# Patient Record
Sex: FEMALE | Race: WHITE | Employment: OTHER | URBAN - NONMETROPOLITAN AREA
[De-identification: names, ages, dates, MRNs, and addresses within clinical notes are randomized per-mention and may not be internally consistent; named-entity substitution may affect disease eponyms.]

---

## 2017-01-01 ENCOUNTER — NURSE ONLY (OUTPATIENT)
Dept: CARDIOLOGY CLINIC | Age: 82
End: 2017-01-01
Payer: MEDICARE

## 2017-01-01 ENCOUNTER — APPOINTMENT (OUTPATIENT)
Dept: CT IMAGING | Age: 82
End: 2017-01-01
Payer: MEDICARE

## 2017-01-01 ENCOUNTER — TELEPHONE (OUTPATIENT)
Dept: CARDIOLOGY | Age: 82
End: 2017-01-01

## 2017-01-01 ENCOUNTER — OFFICE VISIT (OUTPATIENT)
Dept: OTOLARYNGOLOGY | Age: 82
End: 2017-01-01

## 2017-01-01 ENCOUNTER — OFFICE VISIT (OUTPATIENT)
Dept: FAMILY MEDICINE CLINIC | Age: 82
End: 2017-01-01
Payer: MEDICARE

## 2017-01-01 ENCOUNTER — TELEPHONE (OUTPATIENT)
Dept: FAMILY MEDICINE CLINIC | Age: 82
End: 2017-01-01

## 2017-01-01 ENCOUNTER — APPOINTMENT (OUTPATIENT)
Dept: GENERAL RADIOLOGY | Age: 82
End: 2017-01-01
Payer: MEDICARE

## 2017-01-01 ENCOUNTER — HOSPITAL ENCOUNTER (EMERGENCY)
Age: 82
Discharge: HOME | End: 2017-11-27
Payer: MEDICARE

## 2017-01-01 ENCOUNTER — OFFICE VISIT (OUTPATIENT)
Dept: CARDIOLOGY CLINIC | Age: 82
End: 2017-01-01
Payer: MEDICARE

## 2017-01-01 ENCOUNTER — CARE COORDINATION (OUTPATIENT)
Dept: CASE MANAGEMENT | Age: 82
End: 2017-01-01

## 2017-01-01 VITALS
SYSTOLIC BLOOD PRESSURE: 132 MMHG | HEART RATE: 80 BPM | BODY MASS INDEX: 24.35 KG/M2 | WEIGHT: 132.3 LBS | HEIGHT: 62 IN | DIASTOLIC BLOOD PRESSURE: 72 MMHG

## 2017-01-01 VITALS
DIASTOLIC BLOOD PRESSURE: 74 MMHG | WEIGHT: 131.6 LBS | RESPIRATION RATE: 16 BRPM | HEART RATE: 72 BPM | BODY MASS INDEX: 24.87 KG/M2 | SYSTOLIC BLOOD PRESSURE: 122 MMHG | TEMPERATURE: 98.2 F

## 2017-01-01 VITALS
SYSTOLIC BLOOD PRESSURE: 115 MMHG | HEART RATE: 85 BPM | RESPIRATION RATE: 16 BRPM | OXYGEN SATURATION: 99 % | TEMPERATURE: 98.7 F | DIASTOLIC BLOOD PRESSURE: 66 MMHG

## 2017-01-01 VITALS
WEIGHT: 137 LBS | RESPIRATION RATE: 16 BRPM | HEART RATE: 80 BPM | HEIGHT: 62 IN | DIASTOLIC BLOOD PRESSURE: 70 MMHG | SYSTOLIC BLOOD PRESSURE: 128 MMHG | BODY MASS INDEX: 25.21 KG/M2

## 2017-01-01 DIAGNOSIS — J44.9 CHRONIC OBSTRUCTIVE PULMONARY DISEASE, UNSPECIFIED COPD TYPE (HCC): ICD-10-CM

## 2017-01-01 DIAGNOSIS — I49.5 SSS (SICK SINUS SYNDROME) (HCC): ICD-10-CM

## 2017-01-01 DIAGNOSIS — F03.91 DEMENTIA WITH BEHAVIORAL DISTURBANCE, UNSPECIFIED DEMENTIA TYPE: Primary | ICD-10-CM

## 2017-01-01 DIAGNOSIS — Z23 NEED FOR INFLUENZA VACCINATION: ICD-10-CM

## 2017-01-01 DIAGNOSIS — R45.1 AGITATION: ICD-10-CM

## 2017-01-01 DIAGNOSIS — H61.23 BILATERAL IMPACTED CERUMEN: Primary | ICD-10-CM

## 2017-01-01 DIAGNOSIS — Z95.0 PACEMAKER: Primary | ICD-10-CM

## 2017-01-01 DIAGNOSIS — Z23 NEED FOR PNEUMOCOCCAL VACCINATION: ICD-10-CM

## 2017-01-01 DIAGNOSIS — Z95.0 PACEMAKER: ICD-10-CM

## 2017-01-01 DIAGNOSIS — S42.91XA CLOSED FRACTURE OF RIGHT SHOULDER, INITIAL ENCOUNTER: Primary | ICD-10-CM

## 2017-01-01 DIAGNOSIS — H61.303 STENOSIS OF BOTH EXTERNAL AUDITORY CANALS: ICD-10-CM

## 2017-01-01 DIAGNOSIS — I49.5 SSS (SICK SINUS SYNDROME) (HCC): Primary | ICD-10-CM

## 2017-01-01 DIAGNOSIS — M25.562 LEFT KNEE PAIN, UNSPECIFIED CHRONICITY: Primary | ICD-10-CM

## 2017-01-01 PROCEDURE — 4040F PNEUMOC VAC/ADMIN/RCVD: CPT | Performed by: FAMILY MEDICINE

## 2017-01-01 PROCEDURE — 72125 CT NECK SPINE W/O DYE: CPT

## 2017-01-01 PROCEDURE — 70450 CT HEAD/BRAIN W/O DYE: CPT

## 2017-01-01 PROCEDURE — G8420 CALC BMI NORM PARAMETERS: HCPCS | Performed by: INTERNAL MEDICINE

## 2017-01-01 PROCEDURE — 1036F TOBACCO NON-USER: CPT | Performed by: NURSE PRACTITIONER

## 2017-01-01 PROCEDURE — 1090F PRES/ABSN URINE INCON ASSESS: CPT | Performed by: INTERNAL MEDICINE

## 2017-01-01 PROCEDURE — G8400 PT W/DXA NO RESULTS DOC: HCPCS | Performed by: FAMILY MEDICINE

## 2017-01-01 PROCEDURE — 90732 PPSV23 VACC 2 YRS+ SUBQ/IM: CPT | Performed by: FAMILY MEDICINE

## 2017-01-01 PROCEDURE — 1090F PRES/ABSN URINE INCON ASSESS: CPT | Performed by: FAMILY MEDICINE

## 2017-01-01 PROCEDURE — 73030 X-RAY EXAM OF SHOULDER: CPT

## 2017-01-01 PROCEDURE — 1036F TOBACCO NON-USER: CPT | Performed by: INTERNAL MEDICINE

## 2017-01-01 PROCEDURE — G8484 FLU IMMUNIZE NO ADMIN: HCPCS | Performed by: FAMILY MEDICINE

## 2017-01-01 PROCEDURE — 3023F SPIROM DOC REV: CPT | Performed by: FAMILY MEDICINE

## 2017-01-01 PROCEDURE — G8427 DOCREV CUR MEDS BY ELIG CLIN: HCPCS | Performed by: INTERNAL MEDICINE

## 2017-01-01 PROCEDURE — 99213 OFFICE O/P EST LOW 20 MIN: CPT | Performed by: INTERNAL MEDICINE

## 2017-01-01 PROCEDURE — A4565 SLINGS: HCPCS

## 2017-01-01 PROCEDURE — 1036F TOBACCO NON-USER: CPT | Performed by: FAMILY MEDICINE

## 2017-01-01 PROCEDURE — 99284 EMERGENCY DEPT VISIT MOD MDM: CPT

## 2017-01-01 PROCEDURE — G0008 ADMIN INFLUENZA VIRUS VAC: HCPCS | Performed by: FAMILY MEDICINE

## 2017-01-01 PROCEDURE — G8926 SPIRO NO PERF OR DOC: HCPCS | Performed by: FAMILY MEDICINE

## 2017-01-01 PROCEDURE — G8419 CALC BMI OUT NRM PARAM NOF/U: HCPCS | Performed by: FAMILY MEDICINE

## 2017-01-01 PROCEDURE — 93280 PM DEVICE PROGR EVAL DUAL: CPT | Performed by: INTERNAL MEDICINE

## 2017-01-01 PROCEDURE — G8400 PT W/DXA NO RESULTS DOC: HCPCS | Performed by: INTERNAL MEDICINE

## 2017-01-01 PROCEDURE — G8427 DOCREV CUR MEDS BY ELIG CLIN: HCPCS | Performed by: FAMILY MEDICINE

## 2017-01-01 PROCEDURE — 69210 REMOVE IMPACTED EAR WAX UNI: CPT | Performed by: NURSE PRACTITIONER

## 2017-01-01 PROCEDURE — 99214 OFFICE O/P EST MOD 30 MIN: CPT | Performed by: FAMILY MEDICINE

## 2017-01-01 PROCEDURE — 4040F PNEUMOC VAC/ADMIN/RCVD: CPT | Performed by: INTERNAL MEDICINE

## 2017-01-01 PROCEDURE — 1123F ACP DISCUSS/DSCN MKR DOCD: CPT | Performed by: INTERNAL MEDICINE

## 2017-01-01 PROCEDURE — G0009 ADMIN PNEUMOCOCCAL VACCINE: HCPCS | Performed by: FAMILY MEDICINE

## 2017-01-01 PROCEDURE — 1123F ACP DISCUSS/DSCN MKR DOCD: CPT | Performed by: FAMILY MEDICINE

## 2017-01-01 PROCEDURE — 90662 IIV NO PRSV INCREASED AG IM: CPT | Performed by: FAMILY MEDICINE

## 2017-01-01 RX ORDER — CIPROFLOXACIN 250 MG/1
250 TABLET, FILM COATED ORAL 2 TIMES DAILY
Qty: 14 TABLET | Refills: 0 | Status: SHIPPED | OUTPATIENT
Start: 2017-01-01 | End: 2017-01-01 | Stop reason: SDUPTHER

## 2017-01-01 RX ORDER — LORAZEPAM 0.5 MG/1
TABLET ORAL
Qty: 60 TABLET | Refills: 2 | Status: SHIPPED | OUTPATIENT
Start: 2017-01-01 | End: 2017-01-01 | Stop reason: SDUPTHER

## 2017-01-01 RX ORDER — LORAZEPAM 0.5 MG/1
TABLET ORAL
Qty: 60 TABLET | Refills: 0 | Status: SHIPPED | OUTPATIENT
Start: 2017-01-01 | End: 2017-01-01 | Stop reason: SDUPTHER

## 2017-01-01 RX ORDER — CIPROFLOXACIN 250 MG/1
250 TABLET, FILM COATED ORAL 2 TIMES DAILY
Qty: 14 TABLET | Refills: 0 | Status: SHIPPED | OUTPATIENT
Start: 2017-01-01 | End: 2017-01-01

## 2017-01-01 RX ORDER — LORAZEPAM 0.5 MG/1
TABLET ORAL
Qty: 90 TABLET | Refills: 2 | Status: ON HOLD | OUTPATIENT
Start: 2017-01-01 | End: 2018-01-01 | Stop reason: HOSPADM

## 2017-01-01 RX ORDER — LORAZEPAM 0.5 MG/1
TABLET ORAL
Qty: 270 TABLET | Refills: 0 | Status: SHIPPED | OUTPATIENT
Start: 2017-01-01 | End: 2017-01-01 | Stop reason: SDUPTHER

## 2017-01-01 ASSESSMENT — ENCOUNTER SYMPTOMS
ABDOMINAL PAIN: 0
ANAL BLEEDING: 0
NAUSEA: 0
STRIDOR: 0
ABDOMINAL DISTENTION: 0
EYE PAIN: 0
GASTROINTESTINAL NEGATIVE: 1
SORE THROAT: 0
COUGH: 0
APNEA: 0
DIARRHEA: 0
BACK PAIN: 0
EYE DISCHARGE: 0
SINUS PRESSURE: 0
RHINORRHEA: 0
BACK PAIN: 0
RECTAL PAIN: 0
FACIAL SWELLING: 0
VOICE CHANGE: 0
VOMITING: 0
EYE ITCHING: 0
PHOTOPHOBIA: 0
NAUSEA: 0
TROUBLE SWALLOWING: 0
CONSTIPATION: 0
SHORTNESS OF BREATH: 0
EYE REDNESS: 0
RESPIRATORY NEGATIVE: 1
COLOR CHANGE: 0
BLOOD IN STOOL: 0
CHEST TIGHTNESS: 0
CHOKING: 0
WHEEZING: 0

## 2017-01-01 ASSESSMENT — PAIN DESCRIPTION - LOCATION: LOCATION: SHOULDER

## 2017-01-01 ASSESSMENT — PAIN DESCRIPTION - PAIN TYPE: TYPE: ACUTE PAIN

## 2017-01-01 ASSESSMENT — PAIN DESCRIPTION - ORIENTATION: ORIENTATION: RIGHT

## 2017-01-01 ASSESSMENT — PAIN SCALES - GENERAL: PAINLEVEL_OUTOF10: 7

## 2017-01-06 ENCOUNTER — TELEPHONE (OUTPATIENT)
Dept: FAMILY MEDICINE CLINIC | Age: 82
End: 2017-01-06

## 2017-01-06 RX ORDER — UMECLIDINIUM BROMIDE AND VILANTEROL TRIFENATATE 62.5; 25 UG/1; UG/1
1 POWDER RESPIRATORY (INHALATION) DAILY
Qty: 1 PUFF | Refills: 11 | Status: ON HOLD | OUTPATIENT
Start: 2017-01-06 | End: 2018-01-01

## 2017-01-24 ENCOUNTER — OFFICE VISIT (OUTPATIENT)
Dept: CARDIOLOGY | Age: 82
End: 2017-01-24

## 2017-01-24 VITALS
BODY MASS INDEX: 25.11 KG/M2 | HEIGHT: 61 IN | DIASTOLIC BLOOD PRESSURE: 62 MMHG | SYSTOLIC BLOOD PRESSURE: 118 MMHG | WEIGHT: 133 LBS | HEART RATE: 84 BPM

## 2017-01-24 DIAGNOSIS — I49.5 SSS (SICK SINUS SYNDROME) (HCC): Primary | ICD-10-CM

## 2017-01-24 DIAGNOSIS — I34.0 MITRAL VALVE INSUFFICIENCY, UNSPECIFIED ETIOLOGY: ICD-10-CM

## 2017-01-24 DIAGNOSIS — R06.02 SOB (SHORTNESS OF BREATH): ICD-10-CM

## 2017-01-24 PROCEDURE — G8400 PT W/DXA NO RESULTS DOC: HCPCS | Performed by: INTERNAL MEDICINE

## 2017-01-24 PROCEDURE — G8484 FLU IMMUNIZE NO ADMIN: HCPCS | Performed by: INTERNAL MEDICINE

## 2017-01-24 PROCEDURE — 1123F ACP DISCUSS/DSCN MKR DOCD: CPT | Performed by: INTERNAL MEDICINE

## 2017-01-24 PROCEDURE — 1036F TOBACCO NON-USER: CPT | Performed by: INTERNAL MEDICINE

## 2017-01-24 PROCEDURE — 4040F PNEUMOC VAC/ADMIN/RCVD: CPT | Performed by: INTERNAL MEDICINE

## 2017-01-24 PROCEDURE — 99213 OFFICE O/P EST LOW 20 MIN: CPT | Performed by: INTERNAL MEDICINE

## 2017-01-24 PROCEDURE — 1090F PRES/ABSN URINE INCON ASSESS: CPT | Performed by: INTERNAL MEDICINE

## 2017-01-24 PROCEDURE — G8427 DOCREV CUR MEDS BY ELIG CLIN: HCPCS | Performed by: INTERNAL MEDICINE

## 2017-01-24 PROCEDURE — G8420 CALC BMI NORM PARAMETERS: HCPCS | Performed by: INTERNAL MEDICINE

## 2017-02-02 ENCOUNTER — TELEPHONE (OUTPATIENT)
Dept: FAMILY MEDICINE CLINIC | Age: 82
End: 2017-02-02

## 2017-02-02 RX ORDER — RIVASTIGMINE 13.3 MG/24H
1 PATCH, EXTENDED RELEASE TRANSDERMAL DAILY
Qty: 90 PATCH | Refills: 3 | Status: SHIPPED | OUTPATIENT
Start: 2017-02-02 | End: 2017-02-16

## 2017-02-15 ENCOUNTER — PATIENT MESSAGE (OUTPATIENT)
Dept: FAMILY MEDICINE CLINIC | Age: 82
End: 2017-02-15

## 2017-02-16 RX ORDER — DONEPEZIL HYDROCHLORIDE 5 MG/1
5 TABLET, FILM COATED ORAL NIGHTLY
Qty: 30 TABLET | Refills: 1 | Status: ON HOLD | OUTPATIENT
Start: 2017-02-16 | End: 2018-01-01

## 2017-11-28 NOTE — ED NOTES
Pt has alzheimer's and lives with daughter. Daughter states she left her home for 15 minutes and when she came home her mother was holding her right shoulder stating \"it hurts. \" With movement, pt states it hurts. Daughter says she is unsure if she fell or what happened.      Jared Kraus RN  11/27/17 2010

## 2017-11-28 NOTE — ED PROVIDER NOTES
DC    PATIENT REFERRED TO:  PATRICK Contreras 8  [phone]    Go in 1 day  12:30pm. Please arrive 15 minutes early. DISCHARGE MEDICATIONS:  Discharge Medication List as of 11/27/2017  9:57 PM          (Please note that portions of this note were completed with a voice recognition program.  Efforts were made to edit the dictations but occasionally words are mis-transcribed.)    Maylene Felty, PA    Scribe:  Maylene Felty 11/27/17 8:58 PM Scribing for and in the presence of [employer], [place]. Signed by: Maylene Felty, Scribe, 11/27/17 10:32 PM    Provider:  I personally performed the services described in the documentation, reviewed and edited the documentation which was dictated to the scribe in my presence, and it accurately records my words and actions.     Gerard Robins PA-C 11/27/17 10:32 PM        Maylene Felty, PA  11/27/17 [id number]

## 2017-12-12 NOTE — TELEPHONE ENCOUNTER
From: Radha Jacinto  Sent: 12/12/2017 2:04 PM EST  Subject: Medication Renewal Request    Radha Orville would like a refill of the following medications:  LORazepam (ATIVAN) 0.5 MG tablet Adalid Trujillo DO]    Preferred pharmacy: [facility] [address] [facility] [phone]    Comment:  Please send this today. I am out. I called and Duran did not have her refill.

## 2017-12-14 NOTE — PROGRESS NOTES
Subjective:      Patient ID: Jacquie Hawthorne is a 80 y.o. female. HPI:    Chief Complaint   Patient presents with    Annual Exam     Broken right Humurus--seeing Dr. Ariel Carrasco for    Flu Vaccine    Other     Pneumonia vaccine     Annual eval.    Recently fell and broke her right humerus, this is being managed by Dr. Ariel Carrasco. Recently seen earlier this week, no healing yet. Will not wear sling. Dementia continues to progress. Now losing words, gets very frustrated. BP well controlled. BP Readings from Last 3 Encounters:   12/14/17 128/70   11/27/17 115/66   07/25/17 132/72     Weight is up. Appetite is fine. Wt Readings from Last 3 Encounters:   12/14/17 137 lb (62.1 kg)   07/25/17 132 lb 4.8 oz (60 kg)   06/26/17 131 lb 9.6 oz (59.7 kg)     Would like flu and pneumonia shot today. Breathing doing well. Has appt with Dr. Ashely Sullivan in January. Patient Active Problem List   Diagnosis    Medtronic dual pacer    SSS [facility]sick [facility] syndrome) ([facility]    Mitral regurgitation    Lower urinary tract infectious disease    History of MRSA infection    Chest pain    SOB (shortness of breath)    COPD (chronic obstructive pulmonary disease) ([facility]    Dementia with behavioral disturbance     Past Surgical History:   Procedure Laterality Date    BLADDER SUSPENSION      HYSTERECTOMY       Prior to Admission medications    Medication Sig Start Date End Date Taking? Authorizing Provider   LORazepam (ATIVAN) 0.5 MG tablet TAKE 1 TABLET IN THE AM AND 2 QHS.  12/13/17  Yes Doron Adames DO   donepezil (ARICEPT) 5 MG tablet Take 1 tablet by mouth nightly 2/16/17  Yes Doron Adames DO   [facility] ELLIPTA 62.5-25 MCG/INH AEPB inhaler Inhale 1 puff into the lungs daily 1/6/17  Yes Doron Adames DO   ranitidine (ZANTAC) 150 MG capsule Take 150 mg by mouth 2 times daily   Yes Historical Provider, MD   ibuprofen (ADVIL;MOTRIN) 400 MG tablet Take 400 mg by mouth every 6 hours as needed for Pain   Yes Historical Provider, MD   calcium carbonate (TUMS) 500 MG chewable tablet Take 2 tablets by mouth daily   Yes Historical Provider, MD   verapamil (CALAN SR) 180 MG extended release tablet Take 1 tablet by mouth nightly 180 mg at bedtime 12/12/16  Yes Odella Rife, DO   hydrochlorothiazide (HYDRODIURIL) 12.5 MG tablet Take 1 tablet by mouth daily 12/12/16  Yes Odella Rife, DO   amLODIPine (NORVASC) 2.5 MG tablet TAKE 1 TABLET DAILY 12/12/16  Yes Odella Rife, DO   rivastigmine (EXELON) 13.3 MG/24HR Place 1 patch onto the skin daily 12/12/16  Yes Morgan Atkinson DO   potassium chloride (KLOR-CON M) 10 MEQ extended release tablet Indications: take 2 tabs in am and 2 tabs in pm Take 2 tabs BID 12/12/16  Yes Morgan Atkinson DO   traMADol (ULTRAM) 50 MG tablet Take 50 mg by mouth 2 times daily as needed for Pain   Yes Historical Provider, MD   albuterol (PROVENTIL HFA;VENTOLIN HFA) 108 (90 BASE) MCG/ACT inhaler Inhale 2 puffs into the lungs every 4 hours as needed for Wheezing 9/25/15  Yes Danna Blackman MD   acetaminophen (TYLENOL) 325 MG tablet Take 650 mg by mouth every 6 hours as needed.    Yes Historical Provider, MD       No results found for: LABA1C  No results found for: EAG    No components found for: CHLPL  No results found for: TRIG  No results found for: HDL  No results found for: LDLCALC  No results found for: LABVLDL      Chemistry        Component Value Date/Time     12/17/2016 1119     12/06/2016 1755    K 3.4 (L) 12/17/2016 1119    K 4.0 12/06/2016 1755    CL 97 (L) 12/06/2016 1755    CO2 30 12/06/2016 1755    BUN 19 12/06/2016 1755    CREATININE 0.74 12/06/2016 1755        Component Value Date/Time    CALCIUM 9.7 12/06/2016 1755    ALKPHOS 100 12/17/2016 1406    AST 23 12/17/2016 1406    ALT 13 12/17/2016 1406    BILITOT 0.4 12/17/2016 1406            Lab Results   Component Value Date    TSH 2.660 06/01/2015       Lab Results   Component Value Date    WBC 6.3 12/17/2016    HGB influenza vaccination  INFLUENZA, HIGH DOSE, 65 YRS +, IM, PF, PREFILL SYR, 0.5ML (FLUZONE HD)   5. Need for pneumococcal vaccination  Pneumococcal polysaccharide vaccine 23-valent greater than or equal to 3yo subcutaneous/IM           Plan:      -  Chronic medical problems stable  -  Continue current medications  -  Follow up with specialists as scheduled  -  Check labs, will call  -  Immunizations above  -  RTO 6 mos    Quality & Risk Score Accuracy - MEDICARE ADVANTAGE    Visit Dx:  Chronic obstructive pulmonary disease, unspecified COPD type ([facility] [address]  Stable based upon symptoms and exam. Continue current treatment plan and follow up at least yearly.   Last edited 12/14/17 14:38 EST by Margarita Singh, DO

## 2018-01-01 ENCOUNTER — APPOINTMENT (OUTPATIENT)
Dept: GENERAL RADIOLOGY | Age: 83
DRG: 470 | End: 2018-01-01
Payer: MEDICARE

## 2018-01-01 ENCOUNTER — TELEPHONE (OUTPATIENT)
Dept: FAMILY MEDICINE CLINIC | Age: 83
End: 2018-01-01

## 2018-01-01 ENCOUNTER — APPOINTMENT (OUTPATIENT)
Dept: CT IMAGING | Age: 83
DRG: 470 | End: 2018-01-01
Payer: MEDICARE

## 2018-01-01 ENCOUNTER — HOSPITAL ENCOUNTER (INPATIENT)
Age: 83
LOS: 1 days | DRG: 189 | End: 2018-02-25
Attending: HOSPITALIST | Admitting: HOSPITALIST
Payer: COMMERCIAL

## 2018-01-01 ENCOUNTER — APPOINTMENT (OUTPATIENT)
Dept: GENERAL RADIOLOGY | Age: 83
DRG: 193 | End: 2018-01-01
Payer: MEDICARE

## 2018-01-01 ENCOUNTER — ANESTHESIA (OUTPATIENT)
Dept: OPERATING ROOM | Age: 83
DRG: 470 | End: 2018-01-01
Payer: MEDICARE

## 2018-01-01 ENCOUNTER — APPOINTMENT (OUTPATIENT)
Dept: CT IMAGING | Age: 83
DRG: 193 | End: 2018-01-01
Payer: MEDICARE

## 2018-01-01 ENCOUNTER — TELEPHONE (OUTPATIENT)
Dept: CARDIOLOGY CLINIC | Age: 83
End: 2018-01-01

## 2018-01-01 ENCOUNTER — HOSPITAL ENCOUNTER (INPATIENT)
Age: 83
LOS: 9 days | Discharge: NURSING, REHAB OR LONG-TERM CARE | DRG: 470 | End: 2018-01-15
Attending: EMERGENCY MEDICINE | Admitting: INTERNAL MEDICINE
Payer: MEDICARE

## 2018-01-01 ENCOUNTER — HOSPITAL ENCOUNTER (INPATIENT)
Age: 83
LOS: 2 days | Discharge: HOSPICE | DRG: 193 | End: 2018-02-24
Attending: EMERGENCY MEDICINE | Admitting: HOSPITALIST
Payer: MEDICARE

## 2018-01-01 ENCOUNTER — ANESTHESIA EVENT (OUTPATIENT)
Dept: OPERATING ROOM | Age: 83
DRG: 470 | End: 2018-01-01
Payer: MEDICARE

## 2018-01-01 VITALS
OXYGEN SATURATION: 93 % | WEIGHT: 145 LBS | HEART RATE: 152 BPM | HEIGHT: 64 IN | RESPIRATION RATE: 22 BRPM | TEMPERATURE: 99.1 F | SYSTOLIC BLOOD PRESSURE: 154 MMHG | BODY MASS INDEX: 24.75 KG/M2 | DIASTOLIC BLOOD PRESSURE: 92 MMHG

## 2018-01-01 VITALS
OXYGEN SATURATION: 94 % | SYSTOLIC BLOOD PRESSURE: 140 MMHG | TEMPERATURE: 98.9 F | HEIGHT: 64 IN | BODY MASS INDEX: 24.91 KG/M2 | HEART RATE: 129 BPM | WEIGHT: 145.9 LBS | RESPIRATION RATE: 25 BRPM | DIASTOLIC BLOOD PRESSURE: 70 MMHG

## 2018-01-01 VITALS — SYSTOLIC BLOOD PRESSURE: 116 MMHG | DIASTOLIC BLOOD PRESSURE: 64 MMHG | OXYGEN SATURATION: 98 %

## 2018-01-01 VITALS
DIASTOLIC BLOOD PRESSURE: 71 MMHG | SYSTOLIC BLOOD PRESSURE: 120 MMHG | OXYGEN SATURATION: 97 % | TEMPERATURE: 97.7 F | BODY MASS INDEX: 22.55 KG/M2 | WEIGHT: 132.1 LBS | HEIGHT: 64 IN | RESPIRATION RATE: 18 BRPM | HEART RATE: 92 BPM

## 2018-01-01 DIAGNOSIS — J96.01 ACUTE RESPIRATORY FAILURE WITH HYPOXIA AND HYPERCAPNIA (HCC): ICD-10-CM

## 2018-01-01 DIAGNOSIS — J86.9 EMPYEMA LUNG (HCC): Primary | ICD-10-CM

## 2018-01-01 DIAGNOSIS — J96.02 ACUTE RESPIRATORY FAILURE WITH HYPOXIA AND HYPERCAPNIA (HCC): ICD-10-CM

## 2018-01-01 DIAGNOSIS — S72.012A SUBCAPITAL FRACTURE OF FEMUR, LEFT, CLOSED, INITIAL ENCOUNTER (HCC): Primary | ICD-10-CM

## 2018-01-01 LAB
ALBUMIN SERPL-MCNC: 2.6 G/DL (ref 3.5–5.1)
ALBUMIN SERPL-MCNC: 3.7 G/DL (ref 3.5–5.1)
ALLEN TEST: POSITIVE
ALP BLD-CCNC: 112 U/L (ref 38–126)
ALP BLD-CCNC: 164 U/L (ref 38–126)
ALT SERPL-CCNC: 30 U/L (ref 11–66)
ALT SERPL-CCNC: 41 U/L (ref 11–66)
AMYLASE: 32 U/L (ref 20–104)
ANION GAP SERPL CALCULATED.3IONS-SCNC: 10 MEQ/L (ref 8–16)
ANION GAP SERPL CALCULATED.3IONS-SCNC: 14 MEQ/L (ref 8–16)
ANION GAP SERPL CALCULATED.3IONS-SCNC: 14 MEQ/L (ref 8–16)
ANION GAP SERPL CALCULATED.3IONS-SCNC: 6 MEQ/L (ref 8–16)
ANION GAP SERPL CALCULATED.3IONS-SCNC: 6 MEQ/L (ref 8–16)
ANION GAP SERPL CALCULATED.3IONS-SCNC: 7 MEQ/L (ref 8–16)
ANION GAP SERPL CALCULATED.3IONS-SCNC: 8 MEQ/L (ref 8–16)
ANISOCYTOSIS: ABNORMAL
APTT: 25.6 SECONDS (ref 22–38)
AST SERPL-CCNC: 24 U/L (ref 5–40)
AST SERPL-CCNC: 38 U/L (ref 5–40)
BACTERIA: ABNORMAL
BACTERIA: ABNORMAL
BASE EXCESS (CALCULATED): 4.5 MMOL/L (ref -2.5–2.5)
BASE EXCESS (CALCULATED): 4.5 MMOL/L (ref -2.5–2.5)
BASE EXCESS (CALCULATED): 4.8 MMOL/L (ref -2.5–2.5)
BASE EXCESS (CALCULATED): 5.4 MMOL/L (ref -2.5–2.5)
BASE EXCESS (CALCULATED): 5.4 MMOL/L (ref -2.5–2.5)
BASE EXCESS (CALCULATED): 5.5 MMOL/L (ref -2.5–2.5)
BASE EXCESS (CALCULATED): 5.9 MMOL/L (ref -2.5–2.5)
BASOPHILS # BLD: 0 %
BASOPHILS # BLD: 0.1 %
BASOPHILS # BLD: 0.2 %
BASOPHILS # BLD: 0.4 %
BASOPHILS # BLD: 0.5 %
BASOPHILS # BLD: 0.5 %
BASOPHILS ABSOLUTE: 0 THOU/MM3 (ref 0–0.1)
BILIRUB SERPL-MCNC: 0.2 MG/DL (ref 0.3–1.2)
BILIRUB SERPL-MCNC: 0.3 MG/DL (ref 0.3–1.2)
BILIRUBIN URINE: NEGATIVE
BILIRUBIN URINE: NEGATIVE
BLOOD, URINE: ABNORMAL
BLOOD, URINE: NEGATIVE
BUN BLDV-MCNC: 10 MG/DL (ref 7–22)
BUN BLDV-MCNC: 11 MG/DL (ref 7–22)
BUN BLDV-MCNC: 17 MG/DL (ref 7–22)
BUN BLDV-MCNC: 19 MG/DL (ref 7–22)
BUN BLDV-MCNC: 20 MG/DL (ref 7–22)
BUN BLDV-MCNC: 21 MG/DL (ref 7–22)
BUN BLDV-MCNC: 25 MG/DL (ref 7–22)
CALCIUM SERPL-MCNC: 8.2 MG/DL (ref 8.5–10.5)
CALCIUM SERPL-MCNC: 8.4 MG/DL (ref 8.5–10.5)
CALCIUM SERPL-MCNC: 8.5 MG/DL (ref 8.5–10.5)
CALCIUM SERPL-MCNC: 8.8 MG/DL (ref 8.5–10.5)
CALCIUM SERPL-MCNC: 9.2 MG/DL (ref 8.5–10.5)
CALCIUM SERPL-MCNC: 9.2 MG/DL (ref 8.5–10.5)
CALCIUM SERPL-MCNC: 9.7 MG/DL (ref 8.5–10.5)
CASTS: ABNORMAL /LPF
CHARACTER, URINE: ABNORMAL
CHARACTER, URINE: ABNORMAL
CHLORIDE BLD-SCNC: 102 MEQ/L (ref 98–111)
CHLORIDE BLD-SCNC: 103 MEQ/L (ref 98–111)
CHLORIDE BLD-SCNC: 103 MEQ/L (ref 98–111)
CHLORIDE BLD-SCNC: 104 MEQ/L (ref 98–111)
CHLORIDE BLD-SCNC: 94 MEQ/L (ref 98–111)
CHLORIDE BLD-SCNC: 96 MEQ/L (ref 98–111)
CHLORIDE BLD-SCNC: 97 MEQ/L (ref 98–111)
CO2: 25 MEQ/L (ref 23–33)
CO2: 27 MEQ/L (ref 23–33)
CO2: 29 MEQ/L (ref 23–33)
CO2: 29 MEQ/L (ref 23–33)
CO2: 30 MEQ/L (ref 23–33)
CO2: 32 MEQ/L (ref 23–33)
CO2: 32 MEQ/L (ref 23–33)
COLLECTED BY:: 4648
COLLECTED BY:: ABNORMAL
COLOR: ABNORMAL
COLOR: YELLOW
CREAT SERPL-MCNC: 0.4 MG/DL (ref 0.4–1.2)
CREAT SERPL-MCNC: 0.5 MG/DL (ref 0.4–1.2)
CREAT SERPL-MCNC: 0.6 MG/DL (ref 0.4–1.2)
CREAT SERPL-MCNC: 0.6 MG/DL (ref 0.4–1.2)
CRYSTALS: ABNORMAL
CRYSTALS: ABNORMAL
DEVICE: ABNORMAL
EKG ATRIAL RATE: 100 BPM
EKG ATRIAL RATE: 102 BPM
EKG ATRIAL RATE: 91 BPM
EKG ATRIAL RATE: 91 BPM
EKG P AXIS: 60 DEGREES
EKG P AXIS: 70 DEGREES
EKG P AXIS: 72 DEGREES
EKG P-R INTERVAL: 136 MS
EKG P-R INTERVAL: 138 MS
EKG P-R INTERVAL: 142 MS
EKG Q-T INTERVAL: 298 MS
EKG Q-T INTERVAL: 300 MS
EKG Q-T INTERVAL: 308 MS
EKG Q-T INTERVAL: 346 MS
EKG QRS DURATION: 72 MS
EKG QRS DURATION: 76 MS
EKG QRS DURATION: 78 MS
EKG QRS DURATION: 80 MS
EKG QTC CALCULATION (BAZETT): 384 MS
EKG QTC CALCULATION (BAZETT): 391 MS
EKG QTC CALCULATION (BAZETT): 409 MS
EKG QTC CALCULATION (BAZETT): 425 MS
EKG R AXIS: 61 DEGREES
EKG R AXIS: 77 DEGREES
EKG R AXIS: 80 DEGREES
EKG R AXIS: 93 DEGREES
EKG T AXIS: 29 DEGREES
EKG T AXIS: 44 DEGREES
EKG T AXIS: 62 DEGREES
EKG T AXIS: 69 DEGREES
EKG VENTRICULAR RATE: 100 BPM
EKG VENTRICULAR RATE: 102 BPM
EKG VENTRICULAR RATE: 106 BPM
EKG VENTRICULAR RATE: 91 BPM
EOSINOPHIL # BLD: 0.1 %
EOSINOPHIL # BLD: 0.1 %
EOSINOPHIL # BLD: 0.5 %
EOSINOPHIL # BLD: 0.7 %
EOSINOPHIL # BLD: 1.3 %
EOSINOPHIL # BLD: 4 %
EOSINOPHILS ABSOLUTE: 0 THOU/MM3 (ref 0–0.4)
EOSINOPHILS ABSOLUTE: 0.1 THOU/MM3 (ref 0–0.4)
EOSINOPHILS ABSOLUTE: 0.1 THOU/MM3 (ref 0–0.4)
EOSINOPHILS ABSOLUTE: 0.2 THOU/MM3 (ref 0–0.4)
EPITHELIAL CELLS, UA: ABNORMAL /HPF
EPITHELIAL CELLS, UA: ABNORMAL /HPF
FLU A ANTIGEN: NEGATIVE
FLU B ANTIGEN: NEGATIVE
GFR SERPL CREATININE-BSD FRML MDRD: > 90 ML/MIN/1.73M2
GLUCOSE BLD-MCNC: 102 MG/DL (ref 70–108)
GLUCOSE BLD-MCNC: 103 MG/DL (ref 70–108)
GLUCOSE BLD-MCNC: 105 MG/DL (ref 70–108)
GLUCOSE BLD-MCNC: 123 MG/DL (ref 70–108)
GLUCOSE BLD-MCNC: 131 MG/DL (ref 70–108)
GLUCOSE BLD-MCNC: 134 MG/DL (ref 70–108)
GLUCOSE BLD-MCNC: 153 MG/DL (ref 70–108)
GLUCOSE, URINE: NEGATIVE MG/DL
GLUCOSE, URINE: NEGATIVE MG/DL
HCO3: 32 MMOL/L (ref 23–28)
HCO3: 33 MMOL/L (ref 23–28)
HCO3: 33 MMOL/L (ref 23–28)
HCO3: 35 MMOL/L (ref 23–28)
HCT VFR BLD CALC: 26.9 % (ref 37–47)
HCT VFR BLD CALC: 27 % (ref 37–47)
HCT VFR BLD CALC: 27.9 % (ref 37–47)
HCT VFR BLD CALC: 27.9 % (ref 37–47)
HCT VFR BLD CALC: 29.9 % (ref 37–47)
HCT VFR BLD CALC: 31.6 % (ref 37–47)
HCT VFR BLD CALC: 32.9 % (ref 37–47)
HCT VFR BLD CALC: 35.5 % (ref 37–47)
HCT VFR BLD CALC: 35.9 % (ref 37–47)
HEMOCCULT STL QL: NEGATIVE
HEMOGLOBIN: 10.4 GM/DL (ref 12–16)
HEMOGLOBIN: 11.6 GM/DL (ref 12–16)
HEMOGLOBIN: 11.8 GM/DL (ref 12–16)
HEMOGLOBIN: 8.8 GM/DL (ref 12–16)
HEMOGLOBIN: 8.9 GM/DL (ref 12–16)
HEMOGLOBIN: 9 GM/DL (ref 12–16)
HEMOGLOBIN: 9.3 GM/DL (ref 12–16)
HEMOGLOBIN: 9.9 GM/DL (ref 12–16)
HEMOGLOBIN: 9.9 GM/DL (ref 12–16)
IFIO2: 3
IFIO2: 35
IFIO2: 40
IFIO2: 50
INR BLD: 1.03 (ref 0.85–1.13)
KETONES, URINE: NEGATIVE
KETONES, URINE: NEGATIVE
LACTIC ACID, SEPSIS: 1.8 MMOL/L (ref 0.5–1.9)
LACTIC ACID, SEPSIS: 1.9 MMOL/L (ref 0.5–1.9)
LACTIC ACID: 0.8 MMOL/L (ref 0.5–2.2)
LEUKOCYTE EST, POC: ABNORMAL
LEUKOCYTE ESTERASE, URINE: NEGATIVE
LIPASE: 10.9 U/L (ref 5.6–51.3)
LIPASE: 15.9 U/L (ref 5.6–51.3)
LYMPHOCYTES # BLD: 10 %
LYMPHOCYTES # BLD: 11.4 %
LYMPHOCYTES # BLD: 14.2 %
LYMPHOCYTES # BLD: 17.2 %
LYMPHOCYTES # BLD: 20.5 %
LYMPHOCYTES # BLD: 6 %
LYMPHOCYTES ABSOLUTE: 0.6 THOU/MM3 (ref 1–4.8)
LYMPHOCYTES ABSOLUTE: 0.8 THOU/MM3 (ref 1–4.8)
LYMPHOCYTES ABSOLUTE: 0.9 THOU/MM3 (ref 1–4.8)
LYMPHOCYTES ABSOLUTE: 1 THOU/MM3 (ref 1–4.8)
LYMPHOCYTES ABSOLUTE: 1.2 THOU/MM3 (ref 1–4.8)
LYMPHOCYTES ABSOLUTE: 1.8 THOU/MM3 (ref 1–4.8)
MAGNESIUM: 1.9 MG/DL (ref 1.6–2.4)
MCH RBC QN AUTO: 28 PG (ref 27–31)
MCH RBC QN AUTO: 28.3 PG (ref 27–31)
MCH RBC QN AUTO: 29.1 PG (ref 27–31)
MCH RBC QN AUTO: 29.1 PG (ref 27–31)
MCH RBC QN AUTO: 29.2 PG (ref 27–31)
MCH RBC QN AUTO: 29.6 PG (ref 27–31)
MCH RBC QN AUTO: 29.7 PG (ref 27–31)
MCHC RBC AUTO-ENTMCNC: 31.4 GM/DL (ref 33–37)
MCHC RBC AUTO-ENTMCNC: 31.6 GM/DL (ref 33–37)
MCHC RBC AUTO-ENTMCNC: 32.4 GM/DL (ref 33–37)
MCHC RBC AUTO-ENTMCNC: 32.8 GM/DL (ref 33–37)
MCHC RBC AUTO-ENTMCNC: 32.8 GM/DL (ref 33–37)
MCHC RBC AUTO-ENTMCNC: 33.2 GM/DL (ref 33–37)
MCHC RBC AUTO-ENTMCNC: 33.2 GM/DL (ref 33–37)
MCV RBC AUTO: 88.6 FL (ref 81–99)
MCV RBC AUTO: 88.9 FL (ref 81–99)
MCV RBC AUTO: 89 FL (ref 81–99)
MCV RBC AUTO: 89.3 FL (ref 81–99)
MCV RBC AUTO: 89.5 FL (ref 81–99)
MCV RBC AUTO: 89.5 FL (ref 81–99)
MCV RBC AUTO: 89.8 FL (ref 81–99)
MISCELLANEOUS LAB TEST RESULT: ABNORMAL
MISCELLANEOUS LAB TEST RESULT: ABNORMAL
MODE: ABNORMAL
MONOCYTES # BLD: 12.3 %
MONOCYTES # BLD: 12.5 %
MONOCYTES # BLD: 13.8 %
MONOCYTES # BLD: 18.2 %
MONOCYTES # BLD: 7.5 %
MONOCYTES # BLD: 8.5 %
MONOCYTES ABSOLUTE: 0.8 THOU/MM3 (ref 0.4–1.3)
MONOCYTES ABSOLUTE: 1 THOU/MM3 (ref 0.4–1.3)
MONOCYTES ABSOLUTE: 1.1 THOU/MM3 (ref 0.4–1.3)
MONOCYTES ABSOLUTE: 1.3 THOU/MM3 (ref 0.4–1.3)
MRSA SCREEN RT-PCR: NEGATIVE
MRSA SCREEN: NORMAL
MUCUS: ABNORMAL
NITRITE, URINE: NEGATIVE
NITRITE, URINE: NEGATIVE
NUCLEATED RED BLOOD CELLS: 0 /100 WBC
O2 SATURATION: 84 %
O2 SATURATION: 89 %
O2 SATURATION: 92 %
O2 SATURATION: 96 %
O2 SATURATION: 96 %
O2 SATURATION: 97 %
O2 SATURATION: 98 %
OSMOLALITY CALCULATION: 276.8 MOSMOL/KG (ref 275–300)
OSMOLALITY CALCULATION: 286.8 MOSMOL/KG (ref 275–300)
PCO2: 58 MMHG (ref 35–45)
PCO2: 64 MMHG (ref 35–45)
PCO2: 65 MMHG (ref 35–45)
PCO2: 78 MMHG (ref 35–45)
PCO2: 79 MMHG (ref 35–45)
PCO2: 81 MMHG (ref 35–45)
PCO2: 85 MMHG (ref 35–45)
PDW BLD-RTO: 13.4 % (ref 11.5–14.5)
PDW BLD-RTO: 13.8 % (ref 11.5–14.5)
PDW BLD-RTO: 13.9 % (ref 11.5–14.5)
PDW BLD-RTO: 14.4 % (ref 11.5–14.5)
PDW BLD-RTO: 15 % (ref 11.5–14.5)
PDW BLD-RTO: 15 % (ref 11.5–14.5)
PDW BLD-RTO: 15.6 % (ref 11.5–14.5)
PH BLOOD GAS: 7.22 (ref 7.35–7.45)
PH BLOOD GAS: 7.24 (ref 7.35–7.45)
PH BLOOD GAS: 7.25 (ref 7.35–7.45)
PH BLOOD GAS: 7.25 (ref 7.35–7.45)
PH BLOOD GAS: 7.31 (ref 7.35–7.45)
PH BLOOD GAS: 7.31 (ref 7.35–7.45)
PH BLOOD GAS: 7.36 (ref 7.35–7.45)
PH UA: 5.5
PH UA: 8
PLATELET # BLD: 168 THOU/MM3 (ref 130–400)
PLATELET # BLD: 185 THOU/MM3 (ref 130–400)
PLATELET # BLD: 234 THOU/MM3 (ref 130–400)
PLATELET # BLD: 236 THOU/MM3 (ref 130–400)
PLATELET # BLD: 295 THOU/MM3 (ref 130–400)
PLATELET # BLD: 327 THOU/MM3 (ref 130–400)
PLATELET # BLD: 425 THOU/MM3 (ref 130–400)
PMV BLD AUTO: 8.2 MCM (ref 7.4–10.4)
PMV BLD AUTO: 8.3 FL (ref 7.4–10.4)
PMV BLD AUTO: 8.8 FL (ref 7.4–10.4)
PMV BLD AUTO: 8.8 FL (ref 7.4–10.4)
PMV BLD AUTO: 9.1 MCM (ref 7.4–10.4)
PMV BLD AUTO: 9.5 MCM (ref 7.4–10.4)
PMV BLD AUTO: 9.5 MCM (ref 7.4–10.4)
PO2: 106 MMHG (ref 71–104)
PO2: 60 MMHG (ref 71–104)
PO2: 70 MMHG (ref 71–104)
PO2: 79 MMHG (ref 71–104)
PO2: 94 MMHG (ref 71–104)
PO2: 96 MMHG (ref 71–104)
PO2: 97 MMHG (ref 71–104)
POTASSIUM REFLEX MAGNESIUM: 4.5 MEQ/L (ref 3.5–5.2)
POTASSIUM SERPL-SCNC: 3.7 MEQ/L (ref 3.5–5.2)
POTASSIUM SERPL-SCNC: 3.8 MEQ/L (ref 3.5–5.2)
POTASSIUM SERPL-SCNC: 4.2 MEQ/L (ref 3.5–5.2)
POTASSIUM SERPL-SCNC: 4.3 MEQ/L (ref 3.5–5.2)
POTASSIUM SERPL-SCNC: 4.4 MEQ/L (ref 3.5–5.2)
POTASSIUM SERPL-SCNC: 4.9 MEQ/L (ref 3.5–5.2)
PROCALCITONIN: 0.19 NG/ML (ref 0.01–0.09)
PROCALCITONIN: 0.26 NG/ML (ref 0.01–0.09)
PROTEIN UA: 100 MG/DL
PROTEIN UA: NEGATIVE MG/DL
RBC # BLD: 3.01 MILL/MM3 (ref 4.2–5.4)
RBC # BLD: 3.11 MILL/MM3 (ref 4.2–5.4)
RBC # BLD: 3.35 MILL/MM3 (ref 4.2–5.4)
RBC # BLD: 3.56 MILL/MM3 (ref 4.2–5.4)
RBC # BLD: 3.68 MILL/MM3 (ref 4.2–5.4)
RBC # BLD: 3.99 MILL/MM3 (ref 4.2–5.4)
RBC # BLD: 4.05 MILL/MM3 (ref 4.2–5.4)
RBC URINE: ABNORMAL /HPF
RBC URINE: ABNORMAL /HPF
RENAL EPITHELIAL, UA: ABNORMAL
RENAL EPITHELIAL, UA: ABNORMAL
SEG NEUTROPHILS: 61.2 %
SEG NEUTROPHILS: 67.1 %
SEG NEUTROPHILS: 73.8 %
SEG NEUTROPHILS: 75.3 %
SEG NEUTROPHILS: 80.5 %
SEG NEUTROPHILS: 81.6 %
SEGMENTED NEUTROPHILS ABSOLUTE COUNT: 3.5 THOU/MM3 (ref 1.8–7.7)
SEGMENTED NEUTROPHILS ABSOLUTE COUNT: 4 THOU/MM3 (ref 1.8–7.7)
SEGMENTED NEUTROPHILS ABSOLUTE COUNT: 6.1 THOU/MM3 (ref 1.8–7.7)
SEGMENTED NEUTROPHILS ABSOLUTE COUNT: 7.8 THOU/MM3 (ref 1.8–7.7)
SEGMENTED NEUTROPHILS ABSOLUTE COUNT: 8 THOU/MM3 (ref 1.8–7.7)
SEGMENTED NEUTROPHILS ABSOLUTE COUNT: 8.8 THOU/MM3 (ref 1.8–7.7)
SET PEEP: 6 MMHG
SET PEEP: 8 MMHG
SET PRESS SUPP: 12 CMH2O
SET PRESS SUPP: 14 CMH2O
SET PRESS SUPP: 16 CMH2O
SET RESPIRATORY RATE: 20 BPM
SODIUM BLD-SCNC: 135 MEQ/L (ref 135–145)
SODIUM BLD-SCNC: 137 MEQ/L (ref 135–145)
SODIUM BLD-SCNC: 138 MEQ/L (ref 135–145)
SODIUM BLD-SCNC: 139 MEQ/L (ref 135–145)
SODIUM BLD-SCNC: 139 MEQ/L (ref 135–145)
SODIUM BLD-SCNC: 140 MEQ/L (ref 135–145)
SODIUM BLD-SCNC: 140 MEQ/L (ref 135–145)
SOURCE, BLOOD GAS: ABNORMAL
SPECIFIC GRAVITY UA: 1.01 (ref 1–1.03)
SPECIFIC GRAVITY UA: 1.02 (ref 1–1.03)
STREP PNEUMO AG, UR: NEGATIVE
TOTAL PROTEIN: 6.2 G/DL (ref 6.1–8)
TOTAL PROTEIN: 7.7 G/DL (ref 6.1–8)
TSH SERPL DL<=0.05 MIU/L-ACNC: 1.3 UIU/ML (ref 0.4–4.2)
URINE CULTURE, ROUTINE: NORMAL
UROBILINOGEN, URINE: 0.2 EU/DL
UROBILINOGEN, URINE: 0.2 EU/DL
VRE CULTURE: NORMAL
WBC # BLD: 10.6 THOU/MM3 (ref 4.8–10.8)
WBC # BLD: 10.8 THOU/MM3 (ref 4.8–10.8)
WBC # BLD: 5.7 THOU/MM3 (ref 4.8–10.8)
WBC # BLD: 5.9 THOU/MM3 (ref 4.8–10.8)
WBC # BLD: 8.1 THOU/MM3 (ref 4.8–10.8)
WBC # BLD: 9.1 THOU/MM3 (ref 4.8–10.8)
WBC # BLD: 9.9 THOU/MM3 (ref 4.8–10.8)
WBC UA: > 100 /HPF
WBC UA: ABNORMAL /HPF
YEAST: ABNORMAL
YEAST: ABNORMAL

## 2018-01-01 PROCEDURE — 84443 ASSAY THYROID STIM HORMONE: CPT

## 2018-01-01 PROCEDURE — 6360000002 HC RX W HCPCS: Performed by: INTERNAL MEDICINE

## 2018-01-01 PROCEDURE — 85014 HEMATOCRIT: CPT

## 2018-01-01 PROCEDURE — 6370000000 HC RX 637 (ALT 250 FOR IP): Performed by: INTERNAL MEDICINE

## 2018-01-01 PROCEDURE — 36415 COLL VENOUS BLD VENIPUNCTURE: CPT

## 2018-01-01 PROCEDURE — 6360000002 HC RX W HCPCS: Performed by: ORTHOPAEDIC SURGERY

## 2018-01-01 PROCEDURE — 97116 GAIT TRAINING THERAPY: CPT

## 2018-01-01 PROCEDURE — 6370000000 HC RX 637 (ALT 250 FOR IP): Performed by: NURSE PRACTITIONER

## 2018-01-01 PROCEDURE — 2580000003 HC RX 258: Performed by: INTERNAL MEDICINE

## 2018-01-01 PROCEDURE — 2500000003 HC RX 250 WO HCPCS: Performed by: EMERGENCY MEDICINE

## 2018-01-01 PROCEDURE — 6370000000 HC RX 637 (ALT 250 FOR IP): Performed by: FAMILY MEDICINE

## 2018-01-01 PROCEDURE — 6360000002 HC RX W HCPCS: Performed by: HOSPITALIST

## 2018-01-01 PROCEDURE — 81001 URINALYSIS AUTO W/SCOPE: CPT

## 2018-01-01 PROCEDURE — 7100000001 HC PACU RECOVERY - ADDTL 15 MIN: Performed by: ORTHOPAEDIC SURGERY

## 2018-01-01 PROCEDURE — 2500000003 HC RX 250 WO HCPCS: Performed by: INTERNAL MEDICINE

## 2018-01-01 PROCEDURE — 2700000000 HC OXYGEN THERAPY PER DAY

## 2018-01-01 PROCEDURE — 82803 BLOOD GASES ANY COMBINATION: CPT

## 2018-01-01 PROCEDURE — 94660 CPAP INITIATION&MGMT: CPT

## 2018-01-01 PROCEDURE — 87040 BLOOD CULTURE FOR BACTERIA: CPT

## 2018-01-01 PROCEDURE — 97110 THERAPEUTIC EXERCISES: CPT

## 2018-01-01 PROCEDURE — G8987 SELF CARE CURRENT STATUS: HCPCS

## 2018-01-01 PROCEDURE — 93005 ELECTROCARDIOGRAM TRACING: CPT | Performed by: HOSPITALIST

## 2018-01-01 PROCEDURE — 6370000000 HC RX 637 (ALT 250 FOR IP): Performed by: ORTHOPAEDIC SURGERY

## 2018-01-01 PROCEDURE — 99232 SBSQ HOSP IP/OBS MODERATE 35: CPT | Performed by: HOSPITALIST

## 2018-01-01 PROCEDURE — 70450 CT HEAD/BRAIN W/O DYE: CPT

## 2018-01-01 PROCEDURE — 93010 ELECTROCARDIOGRAM REPORT: CPT | Performed by: INTERNAL MEDICINE

## 2018-01-01 PROCEDURE — 85025 COMPLETE CBC W/AUTO DIFF WBC: CPT

## 2018-01-01 PROCEDURE — 82150 ASSAY OF AMYLASE: CPT

## 2018-01-01 PROCEDURE — 97530 THERAPEUTIC ACTIVITIES: CPT

## 2018-01-01 PROCEDURE — 96376 TX/PRO/DX INJ SAME DRUG ADON: CPT

## 2018-01-01 PROCEDURE — 2580000003 HC RX 258: Performed by: HOSPITALIST

## 2018-01-01 PROCEDURE — 87641 MR-STAPH DNA AMP PROBE: CPT

## 2018-01-01 PROCEDURE — 3700000001 HC ADD 15 MINUTES (ANESTHESIA): Performed by: ORTHOPAEDIC SURGERY

## 2018-01-01 PROCEDURE — 36600 WITHDRAWAL OF ARTERIAL BLOOD: CPT

## 2018-01-01 PROCEDURE — 6370000000 HC RX 637 (ALT 250 FOR IP): Performed by: HOSPITALIST

## 2018-01-01 PROCEDURE — 2060000000 HC ICU INTERMEDIATE R&B

## 2018-01-01 PROCEDURE — 2500000003 HC RX 250 WO HCPCS: Performed by: HOSPITALIST

## 2018-01-01 PROCEDURE — 7100000000 HC PACU RECOVERY - FIRST 15 MIN: Performed by: ORTHOPAEDIC SURGERY

## 2018-01-01 PROCEDURE — 83605 ASSAY OF LACTIC ACID: CPT

## 2018-01-01 PROCEDURE — 94640 AIRWAY INHALATION TREATMENT: CPT

## 2018-01-01 PROCEDURE — 72170 X-RAY EXAM OF PELVIS: CPT

## 2018-01-01 PROCEDURE — 85018 HEMOGLOBIN: CPT

## 2018-01-01 PROCEDURE — 83735 ASSAY OF MAGNESIUM: CPT

## 2018-01-01 PROCEDURE — 51702 INSERT TEMP BLADDER CATH: CPT

## 2018-01-01 PROCEDURE — 87086 URINE CULTURE/COLONY COUNT: CPT

## 2018-01-01 PROCEDURE — 99223 1ST HOSP IP/OBS HIGH 75: CPT | Performed by: HOSPITALIST

## 2018-01-01 PROCEDURE — 99285 EMERGENCY DEPT VISIT HI MDM: CPT

## 2018-01-01 PROCEDURE — 99223 1ST HOSP IP/OBS HIGH 75: CPT | Performed by: INTERNAL MEDICINE

## 2018-01-01 PROCEDURE — 2580000003 HC RX 258: Performed by: EMERGENCY MEDICINE

## 2018-01-01 PROCEDURE — 83690 ASSAY OF LIPASE: CPT

## 2018-01-01 PROCEDURE — 99232 SBSQ HOSP IP/OBS MODERATE 35: CPT | Performed by: INTERNAL MEDICINE

## 2018-01-01 PROCEDURE — APPNB15 APP NON BILLABLE TIME 0-15 MINS: Performed by: PHYSICIAN ASSISTANT

## 2018-01-01 PROCEDURE — 3600000015 HC SURGERY LEVEL 5 ADDTL 15MIN: Performed by: ORTHOPAEDIC SURGERY

## 2018-01-01 PROCEDURE — 96375 TX/PRO/DX INJ NEW DRUG ADDON: CPT

## 2018-01-01 PROCEDURE — 96374 THER/PROPH/DIAG INJ IV PUSH: CPT

## 2018-01-01 PROCEDURE — 1200000000 HC SEMI PRIVATE

## 2018-01-01 PROCEDURE — 82272 OCCULT BLD FECES 1-3 TESTS: CPT

## 2018-01-01 PROCEDURE — 6360000002 HC RX W HCPCS: Performed by: NURSE ANESTHETIST, CERTIFIED REGISTERED

## 2018-01-01 PROCEDURE — 72125 CT NECK SPINE W/O DYE: CPT

## 2018-01-01 PROCEDURE — 94761 N-INVAS EAR/PLS OXIMETRY MLT: CPT

## 2018-01-01 PROCEDURE — 80048 BASIC METABOLIC PNL TOTAL CA: CPT

## 2018-01-01 PROCEDURE — 71045 X-RAY EXAM CHEST 1 VIEW: CPT

## 2018-01-01 PROCEDURE — 73502 X-RAY EXAM HIP UNI 2-3 VIEWS: CPT

## 2018-01-01 PROCEDURE — C1776 JOINT DEVICE (IMPLANTABLE): HCPCS | Performed by: ORTHOPAEDIC SURGERY

## 2018-01-01 PROCEDURE — G8988 SELF CARE GOAL STATUS: HCPCS

## 2018-01-01 PROCEDURE — 2500000003 HC RX 250 WO HCPCS: Performed by: ORTHOPAEDIC SURGERY

## 2018-01-01 PROCEDURE — 87804 INFLUENZA ASSAY W/OPTIC: CPT

## 2018-01-01 PROCEDURE — G8978 MOBILITY CURRENT STATUS: HCPCS

## 2018-01-01 PROCEDURE — G8979 MOBILITY GOAL STATUS: HCPCS

## 2018-01-01 PROCEDURE — 1200000003 HC TELEMETRY R&B

## 2018-01-01 PROCEDURE — 6360000002 HC RX W HCPCS: Performed by: EMERGENCY MEDICINE

## 2018-01-01 PROCEDURE — 99231 SBSQ HOSP IP/OBS SF/LOW 25: CPT | Performed by: INTERNAL MEDICINE

## 2018-01-01 PROCEDURE — 85730 THROMBOPLASTIN TIME PARTIAL: CPT

## 2018-01-01 PROCEDURE — 99233 SBSQ HOSP IP/OBS HIGH 50: CPT | Performed by: HOSPITALIST

## 2018-01-01 PROCEDURE — 84145 PROCALCITONIN (PCT): CPT

## 2018-01-01 PROCEDURE — 3700000000 HC ANESTHESIA ATTENDED CARE: Performed by: ORTHOPAEDIC SURGERY

## 2018-01-01 PROCEDURE — 73552 X-RAY EXAM OF FEMUR 2/>: CPT

## 2018-01-01 PROCEDURE — 87899 AGENT NOS ASSAY W/OPTIC: CPT

## 2018-01-01 PROCEDURE — 97162 PT EVAL MOD COMPLEX 30 MIN: CPT

## 2018-01-01 PROCEDURE — 72131 CT LUMBAR SPINE W/O DYE: CPT

## 2018-01-01 PROCEDURE — 97166 OT EVAL MOD COMPLEX 45 MIN: CPT

## 2018-01-01 PROCEDURE — 72128 CT CHEST SPINE W/O DYE: CPT

## 2018-01-01 PROCEDURE — 74018 RADEX ABDOMEN 1 VIEW: CPT

## 2018-01-01 PROCEDURE — 96365 THER/PROPH/DIAG IV INF INIT: CPT

## 2018-01-01 PROCEDURE — 6370000000 HC RX 637 (ALT 250 FOR IP): Performed by: EMERGENCY MEDICINE

## 2018-01-01 PROCEDURE — 99239 HOSP IP/OBS DSCHRG MGMT >30: CPT | Performed by: INTERNAL MEDICINE

## 2018-01-01 PROCEDURE — 0SRS0J9 REPLACEMENT OF LEFT HIP JOINT, FEMORAL SURFACE WITH SYNTHETIC SUBSTITUTE, CEMENTED, OPEN APPROACH: ICD-10-PCS | Performed by: ORTHOPAEDIC SURGERY

## 2018-01-01 PROCEDURE — 85610 PROTHROMBIN TIME: CPT

## 2018-01-01 PROCEDURE — 93005 ELECTROCARDIOGRAM TRACING: CPT

## 2018-01-01 PROCEDURE — 80053 COMPREHEN METABOLIC PANEL: CPT

## 2018-01-01 PROCEDURE — 2580000003 HC RX 258: Performed by: NURSE ANESTHETIST, CERTIFIED REGISTERED

## 2018-01-01 PROCEDURE — 2500000003 HC RX 250 WO HCPCS: Performed by: NURSE ANESTHETIST, CERTIFIED REGISTERED

## 2018-01-01 PROCEDURE — 93005 ELECTROCARDIOGRAM TRACING: CPT | Performed by: EMERGENCY MEDICINE

## 2018-01-01 PROCEDURE — 99231 SBSQ HOSP IP/OBS SF/LOW 25: CPT | Performed by: PHYSICIAN ASSISTANT

## 2018-01-01 PROCEDURE — 85027 COMPLETE CBC AUTOMATED: CPT

## 2018-01-01 PROCEDURE — 71250 CT THORAX DX C-: CPT

## 2018-01-01 PROCEDURE — 99222 1ST HOSP IP/OBS MODERATE 55: CPT | Performed by: PHYSICAL MEDICINE & REHABILITATION

## 2018-01-01 PROCEDURE — 3600000005 HC SURGERY LEVEL 5 BASE: Performed by: ORTHOPAEDIC SURGERY

## 2018-01-01 PROCEDURE — 87081 CULTURE SCREEN ONLY: CPT

## 2018-01-01 DEVICE — TANDEM UNIPOLAR 12/14 TAPER SLEEVE +4
Type: IMPLANTABLE DEVICE | Status: FUNCTIONAL
Brand: TANDEM

## 2018-01-01 DEVICE — TANDEM UNIPOLAR HEAD 47MM
Type: IMPLANTABLE DEVICE | Status: FUNCTIONAL
Brand: TANDEM

## 2018-01-01 DEVICE — CONQUEST FX FEMORAL COMPONENT SIZE 14
Type: IMPLANTABLE DEVICE | Status: FUNCTIONAL
Brand: CONQUEST FX

## 2018-01-01 RX ORDER — DEXTROSE AND SODIUM CHLORIDE 5; .45 G/100ML; G/100ML
INJECTION, SOLUTION INTRAVENOUS CONTINUOUS
Status: CANCELLED | OUTPATIENT
Start: 2018-01-01

## 2018-01-01 RX ORDER — POLYETHYLENE GLYCOL 3350 17 G/17G
17 POWDER, FOR SOLUTION ORAL DAILY
COMMUNITY

## 2018-01-01 RX ORDER — MEGESTROL ACETATE 40 MG/ML
200 SUSPENSION ORAL DAILY
Status: DISCONTINUED | OUTPATIENT
Start: 2018-01-01 | End: 2018-01-01 | Stop reason: HOSPADM

## 2018-01-01 RX ORDER — SODIUM CHLORIDE 9 MG/ML
INJECTION, SOLUTION INTRAVENOUS CONTINUOUS PRN
Status: DISCONTINUED | OUTPATIENT
Start: 2018-01-01 | End: 2018-01-01 | Stop reason: SDUPTHER

## 2018-01-01 RX ORDER — SENNA PLUS 8.6 MG/1
1 TABLET ORAL NIGHTLY
Qty: 30 TABLET | Refills: 0 | Status: SHIPPED | OUTPATIENT
Start: 2018-01-01 | End: 2018-01-01

## 2018-01-01 RX ORDER — PROMETHAZINE HYDROCHLORIDE 25 MG/ML
12.5 INJECTION, SOLUTION INTRAMUSCULAR; INTRAVENOUS
Status: DISCONTINUED | OUTPATIENT
Start: 2018-01-01 | End: 2018-01-01 | Stop reason: HOSPADM

## 2018-01-01 RX ORDER — IPRATROPIUM BROMIDE AND ALBUTEROL SULFATE 2.5; .5 MG/3ML; MG/3ML
1 SOLUTION RESPIRATORY (INHALATION) EVERY 4 HOURS PRN
Status: CANCELLED | OUTPATIENT
Start: 2018-01-01

## 2018-01-01 RX ORDER — UREA 10 %
10 LOTION (ML) TOPICAL NIGHTLY PRN
Status: DISCONTINUED | OUTPATIENT
Start: 2018-01-01 | End: 2018-01-01 | Stop reason: HOSPADM

## 2018-01-01 RX ORDER — SODIUM CHLORIDE 0.9 % (FLUSH) 0.9 %
10 SYRINGE (ML) INJECTION PRN
Status: CANCELLED | OUTPATIENT
Start: 2018-01-01

## 2018-01-01 RX ORDER — AMLODIPINE BESYLATE 2.5 MG/1
2.5 TABLET ORAL DAILY
Qty: 30 TABLET | Refills: 3 | Status: SHIPPED | OUTPATIENT
Start: 2018-01-01

## 2018-01-01 RX ORDER — ACETAMINOPHEN 650 MG/1
650 SUPPOSITORY RECTAL EVERY 4 HOURS PRN
Status: DISCONTINUED | OUTPATIENT
Start: 2018-01-01 | End: 2018-01-01 | Stop reason: HOSPADM

## 2018-01-01 RX ORDER — BISACODYL 10 MG
10 SUPPOSITORY, RECTAL RECTAL DAILY PRN
Status: DISCONTINUED | OUTPATIENT
Start: 2018-01-01 | End: 2018-01-01 | Stop reason: HOSPADM

## 2018-01-01 RX ORDER — ACETAMINOPHEN 325 MG/1
650 TABLET ORAL EVERY 6 HOURS PRN
COMMUNITY

## 2018-01-01 RX ORDER — LORAZEPAM 2 MG/ML
1 INJECTION INTRAMUSCULAR EVERY 4 HOURS PRN
Status: DISCONTINUED | OUTPATIENT
Start: 2018-01-01 | End: 2018-01-01 | Stop reason: HOSPADM

## 2018-01-01 RX ORDER — FENTANYL CITRATE 50 UG/ML
25 INJECTION, SOLUTION INTRAMUSCULAR; INTRAVENOUS ONCE
Status: COMPLETED | OUTPATIENT
Start: 2018-01-01 | End: 2018-01-01

## 2018-01-01 RX ORDER — HYDROCHLOROTHIAZIDE 12.5 MG/1
12.5 TABLET ORAL DAILY
Qty: 90 TABLET | Refills: 2 | Status: SHIPPED | OUTPATIENT
Start: 2018-01-01

## 2018-01-01 RX ORDER — MORPHINE SULFATE 2 MG/ML
1 INJECTION, SOLUTION INTRAMUSCULAR; INTRAVENOUS ONCE
Status: COMPLETED | OUTPATIENT
Start: 2018-01-01 | End: 2018-01-01

## 2018-01-01 RX ORDER — SENNA PLUS 8.6 MG/1
1 TABLET ORAL NIGHTLY
Status: DISCONTINUED | OUTPATIENT
Start: 2018-01-01 | End: 2018-01-01 | Stop reason: HOSPADM

## 2018-01-01 RX ORDER — DEXTROSE MONOHYDRATE 50 MG/ML
INJECTION, SOLUTION INTRAVENOUS CONTINUOUS
Status: DISCONTINUED | OUTPATIENT
Start: 2018-01-01 | End: 2018-01-01

## 2018-01-01 RX ORDER — HYDROCODONE BITARTRATE AND ACETAMINOPHEN 5; 325 MG/1; MG/1
1 TABLET ORAL EVERY 4 HOURS PRN
Status: DISCONTINUED | OUTPATIENT
Start: 2018-01-01 | End: 2018-01-01 | Stop reason: HOSPADM

## 2018-01-01 RX ORDER — DOCUSATE SODIUM 100 MG/1
100 CAPSULE, LIQUID FILLED ORAL DAILY
Status: DISCONTINUED | OUTPATIENT
Start: 2018-01-01 | End: 2018-01-01 | Stop reason: HOSPADM

## 2018-01-01 RX ORDER — MORPHINE SULFATE 2 MG/ML
0.5 INJECTION, SOLUTION INTRAMUSCULAR; INTRAVENOUS
Status: DISCONTINUED | OUTPATIENT
Start: 2018-01-01 | End: 2018-01-01

## 2018-01-01 RX ORDER — PROPOFOL 10 MG/ML
INJECTION, EMULSION INTRAVENOUS PRN
Status: DISCONTINUED | OUTPATIENT
Start: 2018-01-01 | End: 2018-01-01 | Stop reason: SDUPTHER

## 2018-01-01 RX ORDER — SENNA PLUS 8.6 MG/1
1 TABLET ORAL EVERY EVENING
COMMUNITY

## 2018-01-01 RX ORDER — NALOXONE HYDROCHLORIDE 0.4 MG/ML
INJECTION, SOLUTION INTRAMUSCULAR; INTRAVENOUS; SUBCUTANEOUS
Status: DISPENSED
Start: 2018-01-01 | End: 2018-01-01

## 2018-01-01 RX ORDER — MORPHINE SULFATE 2 MG/ML
0.5 INJECTION, SOLUTION INTRAMUSCULAR; INTRAVENOUS EVERY 4 HOURS PRN
Status: DISCONTINUED | OUTPATIENT
Start: 2018-01-01 | End: 2018-01-01

## 2018-01-01 RX ORDER — MORPHINE SULFATE 2 MG/ML
1 INJECTION, SOLUTION INTRAMUSCULAR; INTRAVENOUS
Status: DISCONTINUED | OUTPATIENT
Start: 2018-01-01 | End: 2018-01-01

## 2018-01-01 RX ORDER — FENTANYL CITRATE 50 UG/ML
INJECTION, SOLUTION INTRAMUSCULAR; INTRAVENOUS PRN
Status: DISCONTINUED | OUTPATIENT
Start: 2018-01-01 | End: 2018-01-01 | Stop reason: SDUPTHER

## 2018-01-01 RX ORDER — IPRATROPIUM BROMIDE AND ALBUTEROL SULFATE 2.5; .5 MG/3ML; MG/3ML
1 SOLUTION RESPIRATORY (INHALATION) ONCE
Status: COMPLETED | OUTPATIENT
Start: 2018-01-01 | End: 2018-01-01

## 2018-01-01 RX ORDER — ACETAMINOPHEN 650 MG/1
650 SUPPOSITORY RECTAL EVERY 4 HOURS PRN
Status: CANCELLED | OUTPATIENT
Start: 2018-01-01

## 2018-01-01 RX ORDER — ONDANSETRON 2 MG/ML
4 INJECTION INTRAMUSCULAR; INTRAVENOUS EVERY 6 HOURS PRN
Status: DISCONTINUED | OUTPATIENT
Start: 2018-01-01 | End: 2018-01-01 | Stop reason: HOSPADM

## 2018-01-01 RX ORDER — SODIUM CHLORIDE 9 MG/ML
INJECTION, SOLUTION INTRAVENOUS CONTINUOUS
Status: ACTIVE | OUTPATIENT
Start: 2018-01-01 | End: 2018-01-01

## 2018-01-01 RX ORDER — SODIUM CHLORIDE 0.9 % (FLUSH) 0.9 %
10 SYRINGE (ML) INJECTION PRN
Status: DISCONTINUED | OUTPATIENT
Start: 2018-01-01 | End: 2018-01-01 | Stop reason: HOSPADM

## 2018-01-01 RX ORDER — FAMOTIDINE 20 MG/1
20 TABLET, FILM COATED ORAL DAILY
Qty: 60 TABLET | Refills: 3 | Status: SHIPPED | OUTPATIENT
Start: 2018-01-01 | End: 2018-01-01 | Stop reason: HOSPADM

## 2018-01-01 RX ORDER — SODIUM CHLORIDE 0.9 % (FLUSH) 0.9 %
10 SYRINGE (ML) INJECTION PRN
Status: DISCONTINUED | OUTPATIENT
Start: 2018-01-01 | End: 2018-01-01 | Stop reason: SDUPTHER

## 2018-01-01 RX ORDER — SODIUM CHLORIDE 9 MG/ML
INJECTION, SOLUTION INTRAVENOUS CONTINUOUS
Status: DISCONTINUED | OUTPATIENT
Start: 2018-01-01 | End: 2018-01-01

## 2018-01-01 RX ORDER — LIDOCAINE HYDROCHLORIDE 20 MG/ML
INJECTION, SOLUTION INFILTRATION; PERINEURAL PRN
Status: DISCONTINUED | OUTPATIENT
Start: 2018-01-01 | End: 2018-01-01 | Stop reason: SDUPTHER

## 2018-01-01 RX ORDER — 0.9 % SODIUM CHLORIDE 0.9 %
1800 INTRAVENOUS SOLUTION INTRAVENOUS ONCE
Status: COMPLETED | OUTPATIENT
Start: 2018-01-01 | End: 2018-01-01

## 2018-01-01 RX ORDER — PSEUDOEPHEDRINE HCL 30 MG
100 TABLET ORAL DAILY
Qty: 30 CAPSULE | Refills: 0 | Status: SHIPPED | OUTPATIENT
Start: 2018-01-01 | End: 2018-01-01 | Stop reason: HOSPADM

## 2018-01-01 RX ORDER — SODIUM CHLORIDE, SODIUM LACTATE, POTASSIUM CHLORIDE, CALCIUM CHLORIDE 600; 310; 30; 20 MG/100ML; MG/100ML; MG/100ML; MG/100ML
INJECTION, SOLUTION INTRAVENOUS CONTINUOUS
Status: DISCONTINUED | OUTPATIENT
Start: 2018-01-01 | End: 2018-01-01 | Stop reason: HOSPADM

## 2018-01-01 RX ORDER — MORPHINE SULFATE 2 MG/ML
2 INJECTION, SOLUTION INTRAMUSCULAR; INTRAVENOUS
Status: DISCONTINUED | OUTPATIENT
Start: 2018-01-01 | End: 2018-01-01 | Stop reason: HOSPADM

## 2018-01-01 RX ORDER — GLYCOPYRROLATE 0.2 MG/ML
0.1 INJECTION INTRAMUSCULAR; INTRAVENOUS EVERY 4 HOURS
Status: DISCONTINUED | OUTPATIENT
Start: 2018-01-01 | End: 2018-01-01

## 2018-01-01 RX ORDER — CEFAZOLIN SODIUM 1 G/3ML
INJECTION, POWDER, FOR SOLUTION INTRAMUSCULAR; INTRAVENOUS PRN
Status: DISCONTINUED | OUTPATIENT
Start: 2018-01-01 | End: 2018-01-01 | Stop reason: SDUPTHER

## 2018-01-01 RX ORDER — MORPHINE SULFATE/PF 50 MG/50ML
PATIENT CONTROLLED ANALGESIA SYRINGE INTRAVENOUS CONTINUOUS
Status: CANCELLED | OUTPATIENT
Start: 2018-01-01

## 2018-01-01 RX ORDER — ROCURONIUM BROMIDE 10 MG/ML
INJECTION, SOLUTION INTRAVENOUS PRN
Status: DISCONTINUED | OUTPATIENT
Start: 2018-01-01 | End: 2018-01-01 | Stop reason: SDUPTHER

## 2018-01-01 RX ORDER — LEVOFLOXACIN 5 MG/ML
500 INJECTION, SOLUTION INTRAVENOUS EVERY 24 HOURS
Status: DISCONTINUED | OUTPATIENT
Start: 2018-01-01 | End: 2018-01-01 | Stop reason: HOSPADM

## 2018-01-01 RX ORDER — FAMOTIDINE 20 MG/1
20 TABLET, FILM COATED ORAL DAILY
Status: DISCONTINUED | OUTPATIENT
Start: 2018-01-01 | End: 2018-01-01 | Stop reason: HOSPADM

## 2018-01-01 RX ORDER — CALCIUM CARBONATE 200(500)MG
2 TABLET,CHEWABLE ORAL DAILY
Status: DISCONTINUED | OUTPATIENT
Start: 2018-01-01 | End: 2018-01-01 | Stop reason: HOSPADM

## 2018-01-01 RX ORDER — FENTANYL CITRATE 50 UG/ML
50 INJECTION, SOLUTION INTRAMUSCULAR; INTRAVENOUS EVERY 5 MIN PRN
Status: DISCONTINUED | OUTPATIENT
Start: 2018-01-01 | End: 2018-01-01 | Stop reason: HOSPADM

## 2018-01-01 RX ORDER — LORAZEPAM 0.5 MG/1
0.5 TABLET ORAL
Status: DISCONTINUED | OUTPATIENT
Start: 2018-01-01 | End: 2018-01-01 | Stop reason: HOSPADM

## 2018-01-01 RX ORDER — MINERAL OIL 100 G/100G
1 OIL RECTAL ONCE
Status: DISCONTINUED | OUTPATIENT
Start: 2018-01-01 | End: 2018-01-01 | Stop reason: HOSPADM

## 2018-01-01 RX ORDER — LORAZEPAM 2 MG/ML
INJECTION INTRAMUSCULAR
Status: DISPENSED
Start: 2018-01-01 | End: 2018-01-01

## 2018-01-01 RX ORDER — IPRATROPIUM BROMIDE AND ALBUTEROL SULFATE 2.5; .5 MG/3ML; MG/3ML
1 SOLUTION RESPIRATORY (INHALATION)
Status: DISCONTINUED | OUTPATIENT
Start: 2018-01-01 | End: 2018-01-01 | Stop reason: HOSPADM

## 2018-01-01 RX ORDER — ACETAMINOPHEN 325 MG/1
650 TABLET ORAL EVERY 4 HOURS PRN
Status: DISCONTINUED | OUTPATIENT
Start: 2018-01-01 | End: 2018-01-01 | Stop reason: HOSPADM

## 2018-01-01 RX ORDER — ONDANSETRON 2 MG/ML
4 INJECTION INTRAMUSCULAR; INTRAVENOUS EVERY 6 HOURS PRN
Status: CANCELLED | OUTPATIENT
Start: 2018-01-01

## 2018-01-01 RX ORDER — SODIUM CHLORIDE 0.9 % (FLUSH) 0.9 %
10 SYRINGE (ML) INJECTION EVERY 12 HOURS SCHEDULED
Status: CANCELLED | OUTPATIENT
Start: 2018-01-01

## 2018-01-01 RX ORDER — METOPROLOL TARTRATE 5 MG/5ML
5 INJECTION INTRAVENOUS EVERY 6 HOURS
Status: DISCONTINUED | OUTPATIENT
Start: 2018-01-01 | End: 2018-01-01 | Stop reason: HOSPADM

## 2018-01-01 RX ORDER — KETOROLAC TROMETHAMINE 30 MG/ML
15 INJECTION, SOLUTION INTRAMUSCULAR; INTRAVENOUS EVERY 6 HOURS
Status: DISPENSED | OUTPATIENT
Start: 2018-01-01 | End: 2018-01-01

## 2018-01-01 RX ORDER — DEXTROSE AND SODIUM CHLORIDE 5; .45 G/100ML; G/100ML
INJECTION, SOLUTION INTRAVENOUS CONTINUOUS
Status: DISCONTINUED | OUTPATIENT
Start: 2018-01-01 | End: 2018-01-01 | Stop reason: HOSPADM

## 2018-01-01 RX ORDER — SODIUM CHLORIDE 0.9 % (FLUSH) 0.9 %
10 SYRINGE (ML) INJECTION EVERY 12 HOURS SCHEDULED
Status: DISCONTINUED | OUTPATIENT
Start: 2018-01-01 | End: 2018-01-01 | Stop reason: HOSPADM

## 2018-01-01 RX ORDER — LORAZEPAM 2 MG/ML
1 INJECTION INTRAMUSCULAR EVERY 4 HOURS PRN
Status: CANCELLED | OUTPATIENT
Start: 2018-01-01

## 2018-01-01 RX ORDER — MEPERIDINE HYDROCHLORIDE 25 MG/ML
12.5 INJECTION INTRAMUSCULAR; INTRAVENOUS; SUBCUTANEOUS EVERY 5 MIN PRN
Status: DISCONTINUED | OUTPATIENT
Start: 2018-01-01 | End: 2018-01-01 | Stop reason: HOSPADM

## 2018-01-01 RX ORDER — GLYCOPYRROLATE 0.2 MG/ML
INJECTION INTRAMUSCULAR; INTRAVENOUS PRN
Status: DISCONTINUED | OUTPATIENT
Start: 2018-01-01 | End: 2018-01-01 | Stop reason: SDUPTHER

## 2018-01-01 RX ORDER — VANCOMYCIN HYDROCHLORIDE 1 G/200ML
15 INJECTION, SOLUTION INTRAVENOUS ONCE
Status: COMPLETED | OUTPATIENT
Start: 2018-01-01 | End: 2018-01-01

## 2018-01-01 RX ORDER — GLYCOPYRROLATE 0.2 MG/ML
0.1 INJECTION INTRAMUSCULAR; INTRAVENOUS EVERY 4 HOURS PRN
Status: DISCONTINUED | OUTPATIENT
Start: 2018-01-01 | End: 2018-01-01 | Stop reason: HOSPADM

## 2018-01-01 RX ORDER — AMLODIPINE BESYLATE 2.5 MG/1
2.5 TABLET ORAL DAILY
Status: DISCONTINUED | OUTPATIENT
Start: 2018-01-01 | End: 2018-01-01 | Stop reason: HOSPADM

## 2018-01-01 RX ORDER — HYDROCODONE BITARTRATE AND ACETAMINOPHEN 5; 325 MG/1; MG/1
2 TABLET ORAL EVERY 4 HOURS PRN
Status: DISCONTINUED | OUTPATIENT
Start: 2018-01-01 | End: 2018-01-01 | Stop reason: HOSPADM

## 2018-01-01 RX ORDER — DEXTROSE, SODIUM CHLORIDE, AND POTASSIUM CHLORIDE 5; .45; .15 G/100ML; G/100ML; G/100ML
INJECTION INTRAVENOUS ONCE
Status: COMPLETED | OUTPATIENT
Start: 2018-01-01 | End: 2018-01-01

## 2018-01-01 RX ORDER — BISACODYL 10 MG
10 SUPPOSITORY, RECTAL RECTAL DAILY PRN
Status: CANCELLED | OUTPATIENT
Start: 2018-01-01

## 2018-01-01 RX ORDER — 0.9 % SODIUM CHLORIDE 0.9 %
500 INTRAVENOUS SOLUTION INTRAVENOUS ONCE
Status: DISCONTINUED | OUTPATIENT
Start: 2018-01-01 | End: 2018-01-01 | Stop reason: HOSPADM

## 2018-01-01 RX ORDER — TRANEXAMIC ACID 100 MG/ML
INJECTION, SOLUTION INTRAVENOUS PRN
Status: DISCONTINUED | OUTPATIENT
Start: 2018-01-01 | End: 2018-01-01 | Stop reason: HOSPADM

## 2018-01-01 RX ORDER — SODIUM CHLORIDE 0.9 % (FLUSH) 0.9 %
10 SYRINGE (ML) INJECTION EVERY 12 HOURS SCHEDULED
Status: DISCONTINUED | OUTPATIENT
Start: 2018-01-01 | End: 2018-01-01 | Stop reason: SDUPTHER

## 2018-01-01 RX ORDER — ALBUTEROL SULFATE 2.5 MG/3ML
2.5 SOLUTION RESPIRATORY (INHALATION) EVERY 4 HOURS PRN
COMMUNITY

## 2018-01-01 RX ORDER — LORAZEPAM 0.5 MG/1
TABLET ORAL
COMMUNITY

## 2018-01-01 RX ORDER — HYDROCODONE BITARTRATE AND ACETAMINOPHEN 5; 325 MG/1; MG/1
1-2 TABLET ORAL EVERY 4 HOURS PRN
Qty: 100 TABLET | Refills: 0 | Status: SHIPPED | OUTPATIENT
Start: 2018-01-01 | End: 2018-01-01

## 2018-01-01 RX ORDER — AMLODIPINE BESYLATE 2.5 MG/1
TABLET ORAL
Qty: 90 TABLET | Refills: 2 | Status: SHIPPED | OUTPATIENT
Start: 2018-01-01 | End: 2018-01-01 | Stop reason: HOSPADM

## 2018-01-01 RX ORDER — CLINDAMYCIN PHOSPHATE 300 MG/50ML
300 INJECTION INTRAVENOUS ONCE
Status: COMPLETED | OUTPATIENT
Start: 2018-01-01 | End: 2018-01-01

## 2018-01-01 RX ORDER — DEXAMETHASONE SODIUM PHOSPHATE 4 MG/ML
INJECTION, SOLUTION INTRA-ARTICULAR; INTRALESIONAL; INTRAMUSCULAR; INTRAVENOUS; SOFT TISSUE PRN
Status: DISCONTINUED | OUTPATIENT
Start: 2018-01-01 | End: 2018-01-01 | Stop reason: SDUPTHER

## 2018-01-01 RX ORDER — MORPHINE SULFATE/PF 50 MG/50ML
PATIENT CONTROLLED ANALGESIA SYRINGE INTRAVENOUS CONTINUOUS
Status: DISCONTINUED | OUTPATIENT
Start: 2018-01-01 | End: 2018-01-01 | Stop reason: HOSPADM

## 2018-01-01 RX ORDER — NEOSTIGMINE METHYLSULFATE 1 MG/ML
INJECTION, SOLUTION INTRAVENOUS PRN
Status: DISCONTINUED | OUTPATIENT
Start: 2018-01-01 | End: 2018-01-01 | Stop reason: SDUPTHER

## 2018-01-01 RX ORDER — MORPHINE SULFATE 2 MG/ML
INJECTION, SOLUTION INTRAMUSCULAR; INTRAVENOUS
Status: DISCONTINUED
Start: 2018-01-01 | End: 2018-01-01 | Stop reason: HOSPADM

## 2018-01-01 RX ORDER — LORAZEPAM 2 MG/ML
0.5 INJECTION INTRAMUSCULAR ONCE
Status: COMPLETED | OUTPATIENT
Start: 2018-01-01 | End: 2018-01-01

## 2018-01-01 RX ORDER — POLYETHYLENE GLYCOL 3350 17 G/17G
17 POWDER, FOR SOLUTION ORAL DAILY
Status: DISCONTINUED | OUTPATIENT
Start: 2018-01-01 | End: 2018-01-01 | Stop reason: HOSPADM

## 2018-01-01 RX ORDER — MELATONIN 10 MG
10 CAPSULE ORAL NIGHTLY
COMMUNITY

## 2018-01-01 RX ORDER — FENTANYL CITRATE 50 UG/ML
25 INJECTION, SOLUTION INTRAMUSCULAR; INTRAVENOUS EVERY 5 MIN PRN
Status: DISCONTINUED | OUTPATIENT
Start: 2018-01-01 | End: 2018-01-01 | Stop reason: HOSPADM

## 2018-01-01 RX ORDER — IPRATROPIUM BROMIDE AND ALBUTEROL SULFATE 2.5; .5 MG/3ML; MG/3ML
1 SOLUTION RESPIRATORY (INHALATION) EVERY 4 HOURS PRN
Status: DISCONTINUED | OUTPATIENT
Start: 2018-01-01 | End: 2018-01-01 | Stop reason: HOSPADM

## 2018-01-01 RX ORDER — LIDOCAINE 50 MG/G
2 PATCH TOPICAL DAILY
Status: DISCONTINUED | OUTPATIENT
Start: 2018-01-01 | End: 2018-01-01 | Stop reason: HOSPADM

## 2018-01-01 RX ORDER — ONDANSETRON 2 MG/ML
INJECTION INTRAMUSCULAR; INTRAVENOUS PRN
Status: DISCONTINUED | OUTPATIENT
Start: 2018-01-01 | End: 2018-01-01 | Stop reason: SDUPTHER

## 2018-01-01 RX ORDER — VANCOMYCIN HYDROCHLORIDE 1 G/200ML
1000 INJECTION, SOLUTION INTRAVENOUS EVERY 24 HOURS
Status: DISCONTINUED | OUTPATIENT
Start: 2018-01-01 | End: 2018-01-01 | Stop reason: HOSPADM

## 2018-01-01 RX ADMIN — FAMOTIDINE 20 MG: 20 TABLET, FILM COATED ORAL at 11:34

## 2018-01-01 RX ADMIN — CEFAZOLIN 1000 MG: 1 INJECTION, POWDER, FOR SOLUTION INTRAMUSCULAR; INTRAVENOUS; PARENTERAL at 08:14

## 2018-01-01 RX ADMIN — ACETAMINOPHEN 650 MG: 325 TABLET ORAL at 14:13

## 2018-01-01 RX ADMIN — POLYETHYLENE GLYCOL 3350 17 G: 17 POWDER, FOR SOLUTION ORAL at 10:29

## 2018-01-01 RX ADMIN — LORAZEPAM 0.5 MG: 0.5 TABLET ORAL at 09:58

## 2018-01-01 RX ADMIN — FENTANYL CITRATE 25 MCG: 50 INJECTION INTRAMUSCULAR; INTRAVENOUS at 00:22

## 2018-01-01 RX ADMIN — LORAZEPAM 0.5 MG: 0.5 TABLET ORAL at 17:38

## 2018-01-01 RX ADMIN — CEFEPIME HYDROCHLORIDE 2 G: 2 INJECTION, POWDER, FOR SOLUTION INTRAVENOUS at 16:13

## 2018-01-01 RX ADMIN — Medication 15 MG: at 08:35

## 2018-01-01 RX ADMIN — VERAPAMIL HYDROCHLORIDE 180 MG: 180 TABLET, FILM COATED, EXTENDED RELEASE ORAL at 19:59

## 2018-01-01 RX ADMIN — ACETAMINOPHEN 650 MG: 325 TABLET ORAL at 01:47

## 2018-01-01 RX ADMIN — ACETAMINOPHEN 650 MG: 325 TABLET ORAL at 21:43

## 2018-01-01 RX ADMIN — KETOROLAC TROMETHAMINE 15 MG: 30 INJECTION, SOLUTION INTRAMUSCULAR at 16:43

## 2018-01-01 RX ADMIN — METOROPROLOL TARTRATE 5 MG: 5 INJECTION, SOLUTION INTRAVENOUS at 06:00

## 2018-01-01 RX ADMIN — FAMOTIDINE 20 MG: 20 TABLET, FILM COATED ORAL at 11:46

## 2018-01-01 RX ADMIN — Medication 10 ML: at 22:46

## 2018-01-01 RX ADMIN — ACETAMINOPHEN 650 MG: 325 TABLET ORAL at 09:17

## 2018-01-01 RX ADMIN — IPRATROPIUM BROMIDE AND ALBUTEROL SULFATE 1 AMPULE: .5; 3 SOLUTION RESPIRATORY (INHALATION) at 18:12

## 2018-01-01 RX ADMIN — RIVAROXABAN 10 MG: 10 TABLET, FILM COATED ORAL at 17:20

## 2018-01-01 RX ADMIN — TAZOBACTAM SODIUM AND PIPERACILLIN SODIUM 3.38 G: 375; 3 INJECTION, SOLUTION INTRAVENOUS at 05:33

## 2018-01-01 RX ADMIN — VERAPAMIL HYDROCHLORIDE 180 MG: 180 TABLET, FILM COATED, EXTENDED RELEASE ORAL at 21:43

## 2018-01-01 RX ADMIN — ACETAMINOPHEN 650 MG: 325 TABLET ORAL at 22:32

## 2018-01-01 RX ADMIN — ONDANSETRON 4 MG: 2 INJECTION INTRAMUSCULAR; INTRAVENOUS at 10:05

## 2018-01-01 RX ADMIN — DOCUSATE SODIUM 100 MG: 100 CAPSULE ORAL at 09:03

## 2018-01-01 RX ADMIN — HYDROMORPHONE HYDROCHLORIDE 0.5 MG: 1 INJECTION, SOLUTION INTRAMUSCULAR; INTRAVENOUS; SUBCUTANEOUS at 18:52

## 2018-01-01 RX ADMIN — ACETAMINOPHEN 650 MG: 325 TABLET ORAL at 21:13

## 2018-01-01 RX ADMIN — ENOXAPARIN SODIUM 60 MG: 60 INJECTION SUBCUTANEOUS at 11:13

## 2018-01-01 RX ADMIN — VANCOMYCIN HYDROCHLORIDE 1000 MG: 1 INJECTION, SOLUTION INTRAVENOUS at 16:50

## 2018-01-01 RX ADMIN — VANCOMYCIN HYDROCHLORIDE 1000 MG: 1 INJECTION, SOLUTION INTRAVENOUS at 16:56

## 2018-01-01 RX ADMIN — TAZOBACTAM SODIUM AND PIPERACILLIN SODIUM 3.38 G: 375; 3 INJECTION, SOLUTION INTRAVENOUS at 11:32

## 2018-01-01 RX ADMIN — NEOSTIGMINE METHYLSULFATE 3 MG: 1 INJECTION, SOLUTION INTRAVENOUS at 09:20

## 2018-01-01 RX ADMIN — MORPHINE SULFATE 30 MG: 1 INJECTION INTRAVENOUS at 14:13

## 2018-01-01 RX ADMIN — Medication 10 ML: at 11:13

## 2018-01-01 RX ADMIN — ENOXAPARIN SODIUM 30 MG: 30 INJECTION SUBCUTANEOUS at 09:10

## 2018-01-01 RX ADMIN — FAMOTIDINE 20 MG: 20 TABLET, FILM COATED ORAL at 08:23

## 2018-01-01 RX ADMIN — Medication 10 ML: at 08:11

## 2018-01-01 RX ADMIN — LORAZEPAM 0.5 MG: 0.5 TABLET ORAL at 17:13

## 2018-01-01 RX ADMIN — METOROPROLOL TARTRATE 5 MG: 5 INJECTION, SOLUTION INTRAVENOUS at 15:20

## 2018-01-01 RX ADMIN — LORAZEPAM 0.5 MG: 0.5 TABLET ORAL at 02:11

## 2018-01-01 RX ADMIN — ACETAMINOPHEN 650 MG: 325 TABLET ORAL at 22:47

## 2018-01-01 RX ADMIN — METOROPROLOL TARTRATE 5 MG: 5 INJECTION, SOLUTION INTRAVENOUS at 01:28

## 2018-01-01 RX ADMIN — MORPHINE SULFATE 1 MG: 2 INJECTION, SOLUTION INTRAMUSCULAR; INTRAVENOUS at 12:19

## 2018-01-01 RX ADMIN — MORPHINE SULFATE 0.5 MG: 2 INJECTION, SOLUTION INTRAMUSCULAR; INTRAVENOUS at 03:30

## 2018-01-01 RX ADMIN — VERAPAMIL HYDROCHLORIDE 180 MG: 180 TABLET, FILM COATED, EXTENDED RELEASE ORAL at 22:47

## 2018-01-01 RX ADMIN — IPRATROPIUM BROMIDE AND ALBUTEROL SULFATE 1 AMPULE: .5; 3 SOLUTION RESPIRATORY (INHALATION) at 14:47

## 2018-01-01 RX ADMIN — VERAPAMIL HYDROCHLORIDE 180 MG: 180 TABLET, FILM COATED, EXTENDED RELEASE ORAL at 22:33

## 2018-01-01 RX ADMIN — METOROPROLOL TARTRATE 5 MG: 5 INJECTION, SOLUTION INTRAVENOUS at 21:35

## 2018-01-01 RX ADMIN — METOROPROLOL TARTRATE 5 MG: 5 INJECTION, SOLUTION INTRAVENOUS at 00:55

## 2018-01-01 RX ADMIN — PROPOFOL 70 MG: 10 INJECTION, EMULSION INTRAVENOUS at 08:07

## 2018-01-01 RX ADMIN — KETOROLAC TROMETHAMINE 15 MG: 30 INJECTION, SOLUTION INTRAMUSCULAR at 22:33

## 2018-01-01 RX ADMIN — ACETAMINOPHEN 650 MG: 325 TABLET ORAL at 21:47

## 2018-01-01 RX ADMIN — ACETAMINOPHEN 650 MG: 325 TABLET ORAL at 18:13

## 2018-01-01 RX ADMIN — ACETAMINOPHEN 650 MG: 325 TABLET ORAL at 13:04

## 2018-01-01 RX ADMIN — LORAZEPAM 0.5 MG: 0.5 TABLET ORAL at 14:13

## 2018-01-01 RX ADMIN — IPRATROPIUM BROMIDE AND ALBUTEROL SULFATE 1 AMPULE: .5; 3 SOLUTION RESPIRATORY (INHALATION) at 08:57

## 2018-01-01 RX ADMIN — Medication 10 ML: at 22:28

## 2018-01-01 RX ADMIN — MORPHINE SULFATE 0.5 MG: 2 INJECTION, SOLUTION INTRAMUSCULAR; INTRAVENOUS at 15:45

## 2018-01-01 RX ADMIN — ACETAMINOPHEN 650 MG: 325 TABLET ORAL at 08:07

## 2018-01-01 RX ADMIN — TAZOBACTAM SODIUM AND PIPERACILLIN SODIUM 3.38 G: 375; 3 INJECTION, SOLUTION INTRAVENOUS at 20:30

## 2018-01-01 RX ADMIN — MORPHINE SULFATE 0.5 MG: 2 INJECTION, SOLUTION INTRAMUSCULAR; INTRAVENOUS at 07:00

## 2018-01-01 RX ADMIN — KETOROLAC TROMETHAMINE 15 MG: 30 INJECTION, SOLUTION INTRAMUSCULAR at 04:29

## 2018-01-01 RX ADMIN — SODIUM CHLORIDE: 9 INJECTION, SOLUTION INTRAVENOUS at 19:29

## 2018-01-01 RX ADMIN — MORPHINE SULFATE 0.5 MG: 2 INJECTION, SOLUTION INTRAMUSCULAR; INTRAVENOUS at 00:54

## 2018-01-01 RX ADMIN — VERAPAMIL HYDROCHLORIDE 180 MG: 180 TABLET, FILM COATED, EXTENDED RELEASE ORAL at 20:37

## 2018-01-01 RX ADMIN — MORPHINE SULFATE 30 MG: 1 INJECTION INTRAVENOUS at 05:10

## 2018-01-01 RX ADMIN — LORAZEPAM 0.5 MG: 0.5 TABLET ORAL at 05:27

## 2018-01-01 RX ADMIN — RIVAROXABAN 10 MG: 10 TABLET, FILM COATED ORAL at 17:45

## 2018-01-01 RX ADMIN — CALCIUM CARBONATE 1000 MG: 500 TABLET, CHEWABLE ORAL at 09:03

## 2018-01-01 RX ADMIN — FAMOTIDINE 20 MG: 20 TABLET, FILM COATED ORAL at 09:13

## 2018-01-01 RX ADMIN — LORAZEPAM 0.5 MG: 0.5 TABLET ORAL at 20:37

## 2018-01-01 RX ADMIN — LORAZEPAM 0.5 MG: 0.5 TABLET ORAL at 14:02

## 2018-01-01 RX ADMIN — ACETAMINOPHEN 650 MG: 325 TABLET ORAL at 18:17

## 2018-01-01 RX ADMIN — MEGESTROL ACETATE 200 MG: 40 SUSPENSION ORAL at 11:45

## 2018-01-01 RX ADMIN — ACETAMINOPHEN 650 MG: 325 TABLET ORAL at 18:58

## 2018-01-01 RX ADMIN — IPRATROPIUM BROMIDE AND ALBUTEROL SULFATE 1 AMPULE: .5; 3 SOLUTION RESPIRATORY (INHALATION) at 15:42

## 2018-01-01 RX ADMIN — ACETAMINOPHEN 650 MG: 325 TABLET ORAL at 11:45

## 2018-01-01 RX ADMIN — MORPHINE SULFATE 0.5 MG: 2 INJECTION, SOLUTION INTRAMUSCULAR; INTRAVENOUS at 13:37

## 2018-01-01 RX ADMIN — ENOXAPARIN SODIUM 30 MG: 30 INJECTION SUBCUTANEOUS at 08:22

## 2018-01-01 RX ADMIN — Medication 10 ML: at 20:45

## 2018-01-01 RX ADMIN — ACETAMINOPHEN 650 MG: 325 TABLET ORAL at 17:30

## 2018-01-01 RX ADMIN — LEVOFLOXACIN 500 MG: 5 INJECTION, SOLUTION INTRAVENOUS at 20:08

## 2018-01-01 RX ADMIN — SENNA 8.6 MG: 8.6 TABLET, COATED ORAL at 21:13

## 2018-01-01 RX ADMIN — ENOXAPARIN SODIUM 30 MG: 30 INJECTION SUBCUTANEOUS at 11:34

## 2018-01-01 RX ADMIN — DOCUSATE SODIUM 100 MG: 100 CAPSULE ORAL at 09:09

## 2018-01-01 RX ADMIN — Medication 5 MG: at 01:01

## 2018-01-01 RX ADMIN — LORAZEPAM 0.5 MG: 2 INJECTION INTRAMUSCULAR; INTRAVENOUS at 15:33

## 2018-01-01 RX ADMIN — CEFTRIAXONE SODIUM 1 G: 10 INJECTION, POWDER, FOR SOLUTION INTRAVENOUS at 21:44

## 2018-01-01 RX ADMIN — POLYETHYLENE GLYCOL 3350 17 G: 17 POWDER, FOR SOLUTION ORAL at 11:34

## 2018-01-01 RX ADMIN — SODIUM CHLORIDE: 9 INJECTION, SOLUTION INTRAVENOUS at 11:30

## 2018-01-01 RX ADMIN — MORPHINE SULFATE 0.5 MG: 2 INJECTION, SOLUTION INTRAMUSCULAR; INTRAVENOUS at 12:16

## 2018-01-01 RX ADMIN — MORPHINE SULFATE 0.5 MG: 2 INJECTION, SOLUTION INTRAMUSCULAR; INTRAVENOUS at 04:45

## 2018-01-01 RX ADMIN — ACETAMINOPHEN 650 MG: 325 TABLET ORAL at 07:56

## 2018-01-01 RX ADMIN — METOROPROLOL TARTRATE 5 MG: 5 INJECTION, SOLUTION INTRAVENOUS at 20:06

## 2018-01-01 RX ADMIN — CEFTRIAXONE SODIUM 1 G: 10 INJECTION, POWDER, FOR SOLUTION INTRAVENOUS at 21:50

## 2018-01-01 RX ADMIN — Medication 10 MG: at 22:34

## 2018-01-01 RX ADMIN — FAMOTIDINE 20 MG: 20 TABLET, FILM COATED ORAL at 09:09

## 2018-01-01 RX ADMIN — FAMOTIDINE 20 MG: 20 TABLET, FILM COATED ORAL at 10:29

## 2018-01-01 RX ADMIN — CEFAZOLIN SODIUM 1 G: 1 INJECTION, SOLUTION INTRAVENOUS at 01:38

## 2018-01-01 RX ADMIN — GLYCOPYRROLATE 0.4 MG: 0.2 INJECTION, SOLUTION INTRAMUSCULAR; INTRAVENOUS at 09:20

## 2018-01-01 RX ADMIN — RIVAROXABAN 10 MG: 10 TABLET, FILM COATED ORAL at 19:04

## 2018-01-01 RX ADMIN — ACETAMINOPHEN 650 MG: 325 TABLET ORAL at 11:28

## 2018-01-01 RX ADMIN — MORPHINE SULFATE 0.5 MG: 2 INJECTION, SOLUTION INTRAMUSCULAR; INTRAVENOUS at 02:15

## 2018-01-01 RX ADMIN — MORPHINE SULFATE 0.5 MG: 2 INJECTION, SOLUTION INTRAMUSCULAR; INTRAVENOUS at 18:41

## 2018-01-01 RX ADMIN — POLYETHYLENE GLYCOL 3350 17 G: 17 POWDER, FOR SOLUTION ORAL at 11:46

## 2018-01-01 RX ADMIN — DOCUSATE SODIUM 100 MG: 100 CAPSULE ORAL at 08:23

## 2018-01-01 RX ADMIN — Medication 5 MG: at 21:42

## 2018-01-01 RX ADMIN — SODIUM CHLORIDE: 9 INJECTION, SOLUTION INTRAVENOUS at 05:07

## 2018-01-01 RX ADMIN — Medication 25 MG: at 08:07

## 2018-01-01 RX ADMIN — MORPHINE SULFATE 0.5 MG: 2 INJECTION, SOLUTION INTRAMUSCULAR; INTRAVENOUS at 20:29

## 2018-01-01 RX ADMIN — MORPHINE SULFATE 30 MG: 1 INJECTION INTRAVENOUS at 18:38

## 2018-01-01 RX ADMIN — Medication 10 MG: at 22:47

## 2018-01-01 RX ADMIN — DOCUSATE SODIUM 100 MG: 100 CAPSULE ORAL at 09:11

## 2018-01-01 RX ADMIN — LORAZEPAM 0.5 MG: 0.5 TABLET ORAL at 04:52

## 2018-01-01 RX ADMIN — AMLODIPINE BESYLATE 2.5 MG: 2.5 TABLET ORAL at 09:03

## 2018-01-01 RX ADMIN — Medication 10 ML: at 09:10

## 2018-01-01 RX ADMIN — MORPHINE SULFATE 0.5 MG: 2 INJECTION, SOLUTION INTRAMUSCULAR; INTRAVENOUS at 21:38

## 2018-01-01 RX ADMIN — MEGESTROL ACETATE 200 MG: 40 SUSPENSION ORAL at 16:10

## 2018-01-01 RX ADMIN — CALCIUM CARBONATE 1000 MG: 500 TABLET, CHEWABLE ORAL at 18:32

## 2018-01-01 RX ADMIN — DOCUSATE SODIUM 100 MG: 100 CAPSULE ORAL at 11:46

## 2018-01-01 RX ADMIN — AMLODIPINE BESYLATE 2.5 MG: 2.5 TABLET ORAL at 09:00

## 2018-01-01 RX ADMIN — POLYETHYLENE GLYCOL 3350 17 G: 17 POWDER, FOR SOLUTION ORAL at 08:23

## 2018-01-01 RX ADMIN — LORAZEPAM 0.5 MG: 0.5 TABLET ORAL at 22:32

## 2018-01-01 RX ADMIN — POLYETHYLENE GLYCOL 3350 17 G: 17 POWDER, FOR SOLUTION ORAL at 09:03

## 2018-01-01 RX ADMIN — VERAPAMIL HYDROCHLORIDE 180 MG: 180 TABLET, FILM COATED, EXTENDED RELEASE ORAL at 21:14

## 2018-01-01 RX ADMIN — DEXAMETHASONE SODIUM PHOSPHATE 8 MG: 4 INJECTION, SOLUTION INTRAMUSCULAR; INTRAVENOUS at 08:30

## 2018-01-01 RX ADMIN — LIDOCAINE HYDROCHLORIDE 60 MG: 20 INJECTION, SOLUTION INFILTRATION; PERINEURAL at 08:07

## 2018-01-01 RX ADMIN — ENOXAPARIN SODIUM 60 MG: 60 INJECTION SUBCUTANEOUS at 12:15

## 2018-01-01 RX ADMIN — FAMOTIDINE 20 MG: 20 TABLET, FILM COATED ORAL at 09:03

## 2018-01-01 RX ADMIN — ACETAMINOPHEN 650 MG: 325 TABLET ORAL at 17:45

## 2018-01-01 RX ADMIN — AMLODIPINE BESYLATE 2.5 MG: 2.5 TABLET ORAL at 09:13

## 2018-01-01 RX ADMIN — CALCIUM CARBONATE 1000 MG: 500 TABLET, CHEWABLE ORAL at 09:13

## 2018-01-01 RX ADMIN — POLYETHYLENE GLYCOL 3350 17 G: 17 POWDER, FOR SOLUTION ORAL at 09:12

## 2018-01-01 RX ADMIN — AMLODIPINE BESYLATE 2.5 MG: 2.5 TABLET ORAL at 09:24

## 2018-01-01 RX ADMIN — ACETAMINOPHEN 650 MG: 325 TABLET ORAL at 09:10

## 2018-01-01 RX ADMIN — AMLODIPINE BESYLATE 2.5 MG: 2.5 TABLET ORAL at 10:29

## 2018-01-01 RX ADMIN — RIVAROXABAN 10 MG: 10 TABLET, FILM COATED ORAL at 17:33

## 2018-01-01 RX ADMIN — FENTANYL CITRATE 25 MCG: 50 INJECTION INTRAMUSCULAR; INTRAVENOUS at 04:37

## 2018-01-01 RX ADMIN — LEVOFLOXACIN 500 MG: 5 INJECTION, SOLUTION INTRAVENOUS at 20:29

## 2018-01-01 RX ADMIN — TAZOBACTAM SODIUM AND PIPERACILLIN SODIUM 3.38 G: 375; 3 INJECTION, SOLUTION INTRAVENOUS at 22:04

## 2018-01-01 RX ADMIN — DOCUSATE SODIUM 100 MG: 100 CAPSULE ORAL at 11:34

## 2018-01-01 RX ADMIN — Medication 5 MG: at 21:18

## 2018-01-01 RX ADMIN — MEGESTROL ACETATE 200 MG: 40 SUSPENSION ORAL at 09:13

## 2018-01-01 RX ADMIN — ACETAMINOPHEN 650 MG: 325 TABLET ORAL at 17:33

## 2018-01-01 RX ADMIN — LORAZEPAM 0.5 MG: 0.5 TABLET ORAL at 22:04

## 2018-01-01 RX ADMIN — IPRATROPIUM BROMIDE AND ALBUTEROL SULFATE 1 AMPULE: .5; 3 SOLUTION RESPIRATORY (INHALATION) at 21:14

## 2018-01-01 RX ADMIN — MORPHINE SULFATE 0.5 MG: 2 INJECTION, SOLUTION INTRAMUSCULAR; INTRAVENOUS at 05:51

## 2018-01-01 RX ADMIN — SENNA 8.6 MG: 8.6 TABLET, COATED ORAL at 21:42

## 2018-01-01 RX ADMIN — AMLODIPINE BESYLATE 2.5 MG: 2.5 TABLET ORAL at 08:23

## 2018-01-01 RX ADMIN — ACETAMINOPHEN 650 MG: 325 TABLET ORAL at 13:34

## 2018-01-01 RX ADMIN — ACETAMINOPHEN 650 MG: 325 TABLET ORAL at 17:28

## 2018-01-01 RX ADMIN — CEFTRIAXONE SODIUM 1 G: 10 INJECTION, POWDER, FOR SOLUTION INTRAVENOUS at 01:11

## 2018-01-01 RX ADMIN — ACETAMINOPHEN 650 MG: 650 SUPPOSITORY RECTAL at 02:43

## 2018-01-01 RX ADMIN — MEGESTROL ACETATE 200 MG: 40 SUSPENSION ORAL at 10:29

## 2018-01-01 RX ADMIN — ENOXAPARIN SODIUM 60 MG: 60 INJECTION SUBCUTANEOUS at 20:07

## 2018-01-01 RX ADMIN — LORAZEPAM 1 MG: 2 INJECTION INTRAMUSCULAR; INTRAVENOUS at 13:34

## 2018-01-01 RX ADMIN — ACETAMINOPHEN 650 MG: 325 TABLET ORAL at 21:31

## 2018-01-01 RX ADMIN — SENNA 8.6 MG: 8.6 TABLET, COATED ORAL at 22:04

## 2018-01-01 RX ADMIN — FENTANYL CITRATE 50 MCG: 50 INJECTION INTRAMUSCULAR; INTRAVENOUS at 08:07

## 2018-01-01 RX ADMIN — MORPHINE SULFATE 0.5 MG: 2 INJECTION, SOLUTION INTRAMUSCULAR; INTRAVENOUS at 22:45

## 2018-01-01 RX ADMIN — KETOROLAC TROMETHAMINE 15 MG: 30 INJECTION, SOLUTION INTRAMUSCULAR at 04:46

## 2018-01-01 RX ADMIN — POTASSIUM CHLORIDE, DEXTROSE MONOHYDRATE AND SODIUM CHLORIDE: 150; 5; 450 INJECTION, SOLUTION INTRAVENOUS at 09:59

## 2018-01-01 RX ADMIN — ONDANSETRON 4 MG: 2 INJECTION INTRAMUSCULAR; INTRAVENOUS at 08:30

## 2018-01-01 RX ADMIN — MORPHINE SULFATE 0.5 MG: 2 INJECTION, SOLUTION INTRAMUSCULAR; INTRAVENOUS at 07:34

## 2018-01-01 RX ADMIN — LORAZEPAM 0.5 MG: 0.5 TABLET ORAL at 02:16

## 2018-01-01 RX ADMIN — ACETAMINOPHEN 650 MG: 325 TABLET ORAL at 17:20

## 2018-01-01 RX ADMIN — DEXTROSE AND SODIUM CHLORIDE: 5; 450 INJECTION, SOLUTION INTRAVENOUS at 15:06

## 2018-01-01 RX ADMIN — MORPHINE SULFATE 0.5 MG: 2 INJECTION, SOLUTION INTRAMUSCULAR; INTRAVENOUS at 08:00

## 2018-01-01 RX ADMIN — LORAZEPAM 0.5 MG: 0.5 TABLET ORAL at 08:07

## 2018-01-01 RX ADMIN — MORPHINE SULFATE 0.5 MG: 2 INJECTION, SOLUTION INTRAMUSCULAR; INTRAVENOUS at 02:51

## 2018-01-01 RX ADMIN — SODIUM CHLORIDE 1000 ML: 9 INJECTION, SOLUTION INTRAVENOUS at 14:25

## 2018-01-01 RX ADMIN — SENNA 8.6 MG: 8.6 TABLET, COATED ORAL at 20:37

## 2018-01-01 RX ADMIN — ACETAMINOPHEN 650 MG: 325 TABLET ORAL at 10:05

## 2018-01-01 RX ADMIN — AMLODIPINE BESYLATE 2.5 MG: 2.5 TABLET ORAL at 11:46

## 2018-01-01 RX ADMIN — CEFAZOLIN SODIUM 1 G: 1 INJECTION, SOLUTION INTRAVENOUS at 16:52

## 2018-01-01 RX ADMIN — ACETAMINOPHEN 650 MG: 325 TABLET ORAL at 14:02

## 2018-01-01 RX ADMIN — LORAZEPAM 1 MG: 2 INJECTION INTRAMUSCULAR; INTRAVENOUS at 18:32

## 2018-01-01 RX ADMIN — ACETAMINOPHEN 650 MG: 325 TABLET ORAL at 02:30

## 2018-01-01 RX ADMIN — CALCIUM CARBONATE 1000 MG: 500 TABLET, CHEWABLE ORAL at 10:29

## 2018-01-01 RX ADMIN — GLYCOPYRROLATE 0.1 MG: 0.2 INJECTION, SOLUTION INTRAMUSCULAR; INTRAVENOUS at 04:53

## 2018-01-01 RX ADMIN — VERAPAMIL HYDROCHLORIDE 180 MG: 180 TABLET, FILM COATED, EXTENDED RELEASE ORAL at 20:16

## 2018-01-01 RX ADMIN — FAMOTIDINE 20 MG: 20 TABLET, FILM COATED ORAL at 09:59

## 2018-01-01 RX ADMIN — SODIUM CHLORIDE, POTASSIUM CHLORIDE, SODIUM LACTATE AND CALCIUM CHLORIDE: 600; 310; 30; 20 INJECTION, SOLUTION INTRAVENOUS at 09:52

## 2018-01-01 RX ADMIN — POLYETHYLENE GLYCOL 3350 17 G: 17 POWDER, FOR SOLUTION ORAL at 11:00

## 2018-01-01 RX ADMIN — AMLODIPINE BESYLATE 2.5 MG: 2.5 TABLET ORAL at 09:59

## 2018-01-01 RX ADMIN — ACETAMINOPHEN 650 MG: 325 TABLET ORAL at 04:30

## 2018-01-01 RX ADMIN — DEXTROSE AND SODIUM CHLORIDE: 5; 450 INJECTION, SOLUTION INTRAVENOUS at 04:26

## 2018-01-01 RX ADMIN — DEXTROSE AND SODIUM CHLORIDE: 5; 450 INJECTION, SOLUTION INTRAVENOUS at 18:28

## 2018-01-01 RX ADMIN — METOROPROLOL TARTRATE 5 MG: 5 INJECTION, SOLUTION INTRAVENOUS at 12:15

## 2018-01-01 RX ADMIN — ACETAMINOPHEN 650 MG: 325 TABLET ORAL at 13:28

## 2018-01-01 RX ADMIN — ACETAMINOPHEN 650 MG: 325 TABLET ORAL at 03:36

## 2018-01-01 RX ADMIN — ACETAMINOPHEN 650 MG: 325 TABLET ORAL at 12:07

## 2018-01-01 RX ADMIN — GLYCOPYRROLATE 0.1 MG: 0.2 INJECTION, SOLUTION INTRAMUSCULAR; INTRAVENOUS at 21:50

## 2018-01-01 RX ADMIN — KETOROLAC TROMETHAMINE 15 MG: 30 INJECTION, SOLUTION INTRAMUSCULAR at 12:04

## 2018-01-01 RX ADMIN — DOCUSATE SODIUM 100 MG: 100 CAPSULE ORAL at 10:29

## 2018-01-01 RX ADMIN — PHENYLEPHRINE HYDROCHLORIDE 100 MCG: 10 INJECTION INTRAMUSCULAR; INTRAVENOUS; SUBCUTANEOUS at 08:41

## 2018-01-01 RX ADMIN — KETOROLAC TROMETHAMINE 15 MG: 30 INJECTION, SOLUTION INTRAMUSCULAR at 10:30

## 2018-01-01 RX ADMIN — CALCIUM CARBONATE 1000 MG: 500 TABLET, CHEWABLE ORAL at 09:24

## 2018-01-01 RX ADMIN — SUGAMMADEX 200 MG: 100 INJECTION, SOLUTION INTRAVENOUS at 09:27

## 2018-01-01 RX ADMIN — ACETAMINOPHEN 650 MG: 325 TABLET ORAL at 17:38

## 2018-01-01 RX ADMIN — MORPHINE SULFATE 1 MG: 2 INJECTION, SOLUTION INTRAMUSCULAR; INTRAVENOUS at 08:20

## 2018-01-01 RX ADMIN — METOROPROLOL TARTRATE 5 MG: 5 INJECTION, SOLUTION INTRAVENOUS at 07:38

## 2018-01-01 RX ADMIN — IPRATROPIUM BROMIDE AND ALBUTEROL SULFATE 1 AMPULE: .5; 3 SOLUTION RESPIRATORY (INHALATION) at 13:54

## 2018-01-01 RX ADMIN — VERAPAMIL HYDROCHLORIDE 180 MG: 180 TABLET, FILM COATED, EXTENDED RELEASE ORAL at 22:04

## 2018-01-01 RX ADMIN — IPRATROPIUM BROMIDE AND ALBUTEROL SULFATE 1 AMPULE: .5; 3 SOLUTION RESPIRATORY (INHALATION) at 09:00

## 2018-01-01 RX ADMIN — MORPHINE SULFATE 1 MG: 2 INJECTION, SOLUTION INTRAMUSCULAR; INTRAVENOUS at 12:37

## 2018-01-01 RX ADMIN — ACETAMINOPHEN 650 MG: 650 SUPPOSITORY RECTAL at 14:00

## 2018-01-01 RX ADMIN — ACETAMINOPHEN 650 MG: 325 TABLET ORAL at 16:10

## 2018-01-01 RX ADMIN — ENOXAPARIN SODIUM 30 MG: 30 INJECTION SUBCUTANEOUS at 09:30

## 2018-01-01 RX ADMIN — CLINDAMYCIN PHOSPHATE 300 MG: 6 INJECTION, SOLUTION INTRAMUSCULAR; INTRAVENOUS at 17:55

## 2018-01-01 RX ADMIN — Medication 5 MG: at 20:16

## 2018-01-01 RX ADMIN — AMLODIPINE BESYLATE 2.5 MG: 2.5 TABLET ORAL at 12:30

## 2018-01-01 RX ADMIN — Medication 10 ML: at 22:00

## 2018-01-01 RX ADMIN — KETOROLAC TROMETHAMINE 15 MG: 30 INJECTION, SOLUTION INTRAMUSCULAR at 23:29

## 2018-01-01 RX ADMIN — Medication 10 ML: at 10:01

## 2018-01-01 RX ADMIN — SODIUM CHLORIDE: 9 INJECTION, SOLUTION INTRAVENOUS at 08:06

## 2018-01-01 RX ADMIN — SENNA 8.6 MG: 8.6 TABLET, COATED ORAL at 22:44

## 2018-01-01 RX ADMIN — CALCIUM CARBONATE 1000 MG: 500 TABLET, CHEWABLE ORAL at 11:45

## 2018-01-01 RX ADMIN — DEXTROSE AND SODIUM CHLORIDE: 5; 450 INJECTION, SOLUTION INTRAVENOUS at 00:49

## 2018-01-01 RX ADMIN — LORAZEPAM 0.5 MG: 0.5 TABLET ORAL at 13:35

## 2018-01-01 RX ADMIN — PHENYLEPHRINE HYDROCHLORIDE 100 MCG: 10 INJECTION INTRAMUSCULAR; INTRAVENOUS; SUBCUTANEOUS at 08:45

## 2018-01-01 RX ADMIN — LORAZEPAM 0.5 MG: 0.5 TABLET ORAL at 18:58

## 2018-01-01 RX ADMIN — ACETAMINOPHEN 650 MG: 325 TABLET ORAL at 02:11

## 2018-01-01 RX ADMIN — ACETAMINOPHEN 650 MG: 325 TABLET ORAL at 05:58

## 2018-01-01 RX ADMIN — SENNA 8.6 MG: 8.6 TABLET, COATED ORAL at 20:16

## 2018-01-01 RX ADMIN — MORPHINE SULFATE 1 MG: 2 INJECTION, SOLUTION INTRAMUSCULAR; INTRAVENOUS at 11:06

## 2018-01-01 RX ADMIN — Medication 5 MG: at 21:49

## 2018-01-01 RX ADMIN — POLYETHYLENE GLYCOL 3350 17 G: 17 POWDER, FOR SOLUTION ORAL at 09:21

## 2018-01-01 RX ADMIN — SODIUM CHLORIDE, POTASSIUM CHLORIDE, SODIUM LACTATE AND CALCIUM CHLORIDE: 600; 310; 30; 20 INJECTION, SOLUTION INTRAVENOUS at 14:00

## 2018-01-01 RX ADMIN — FAMOTIDINE 20 MG: 20 TABLET, FILM COATED ORAL at 09:24

## 2018-01-01 ASSESSMENT — PAIN SCALES - WONG BAKER
WONGBAKER_NUMERICALRESPONSE: 6
WONGBAKER_NUMERICALRESPONSE: 8
WONGBAKER_NUMERICALRESPONSE: 4
WONGBAKER_NUMERICALRESPONSE: 8
WONGBAKER_NUMERICALRESPONSE: 6
WONGBAKER_NUMERICALRESPONSE: 4
WONGBAKER_NUMERICALRESPONSE: 6
WONGBAKER_NUMERICALRESPONSE: 8
WONGBAKER_NUMERICALRESPONSE: 0
WONGBAKER_NUMERICALRESPONSE: 2
WONGBAKER_NUMERICALRESPONSE: 4
WONGBAKER_NUMERICALRESPONSE: 2
WONGBAKER_NUMERICALRESPONSE: 6
WONGBAKER_NUMERICALRESPONSE: 8
WONGBAKER_NUMERICALRESPONSE: 0
WONGBAKER_NUMERICALRESPONSE: 8
WONGBAKER_NUMERICALRESPONSE: 2
WONGBAKER_NUMERICALRESPONSE: 6
WONGBAKER_NUMERICALRESPONSE: 2
WONGBAKER_NUMERICALRESPONSE: 6
WONGBAKER_NUMERICALRESPONSE: 6
WONGBAKER_NUMERICALRESPONSE: 0
WONGBAKER_NUMERICALRESPONSE: 8
WONGBAKER_NUMERICALRESPONSE: 8
WONGBAKER_NUMERICALRESPONSE: 6
WONGBAKER_NUMERICALRESPONSE: 6
WONGBAKER_NUMERICALRESPONSE: 2
WONGBAKER_NUMERICALRESPONSE: 8
WONGBAKER_NUMERICALRESPONSE: 0
WONGBAKER_NUMERICALRESPONSE: 6
WONGBAKER_NUMERICALRESPONSE: 2
WONGBAKER_NUMERICALRESPONSE: 0
WONGBAKER_NUMERICALRESPONSE: 6
WONGBAKER_NUMERICALRESPONSE: 6
WONGBAKER_NUMERICALRESPONSE: 8
WONGBAKER_NUMERICALRESPONSE: 6
WONGBAKER_NUMERICALRESPONSE: 8
WONGBAKER_NUMERICALRESPONSE: 0
WONGBAKER_NUMERICALRESPONSE: 8
WONGBAKER_NUMERICALRESPONSE: 6
WONGBAKER_NUMERICALRESPONSE: 2
WONGBAKER_NUMERICALRESPONSE: 8
WONGBAKER_NUMERICALRESPONSE: 8
WONGBAKER_NUMERICALRESPONSE: 2

## 2018-01-01 ASSESSMENT — PAIN SCALES - PAIN ASSESSMENT IN ADVANCED DEMENTIA (PAINAD)
FACIALEXPRESSION: 0
CONSOLABILITY: 1
BREATHING: 1
TOTALSCORE: 5
FACIALEXPRESSION: 0
BREATHING: 1
NEGVOCALIZATION: 1
BODYLANGUAGE: 1
BREATHING: 0
NEGVOCALIZATION: 1
TOTALSCORE: 3
CONSOLABILITY: 0
BREATHING: 0
BREATHING: 0
CONSOLABILITY: 1
BODYLANGUAGE: 1
BODYLANGUAGE: 0
NEGVOCALIZATION: 0
BREATHING: 0
TOTALSCORE: 5
TOTALSCORE: 5
BODYLANGUAGE: 1
FACIALEXPRESSION: 1
BODYLANGUAGE: 1
BODYLANGUAGE: 0
NEGVOCALIZATION: 0
CONSOLABILITY: 1
FACIALEXPRESSION: 0
BODYLANGUAGE: 1
CONSOLABILITY: 1
NEGVOCALIZATION: 0
BREATHING: 0
NEGVOCALIZATION: 1
FACIALEXPRESSION: 0
BODYLANGUAGE: 0
NEGVOCALIZATION: 1
CONSOLABILITY: 1
TOTALSCORE: 1
BREATHING: 0
NEGVOCALIZATION: 1
BODYLANGUAGE: 0
CONSOLABILITY: 1
BREATHING: 0
TOTALSCORE: 2
FACIALEXPRESSION: 0
BREATHING: 1
FACIALEXPRESSION: 0
FACIALEXPRESSION: 0
TOTALSCORE: 0
NEGVOCALIZATION: 1
CONSOLABILITY: 1
TOTALSCORE: 0
BODYLANGUAGE: 0
BREATHING: 0
BODYLANGUAGE: 0
BREATHING: 1
TOTALSCORE: 0
CONSOLABILITY: 1
NEGVOCALIZATION: 1
FACIALEXPRESSION: 1
NEGVOCALIZATION: 1
BODYLANGUAGE: 1
TOTALSCORE: 0
NEGVOCALIZATION: 0
TOTALSCORE: 5
NEGVOCALIZATION: 0
CONSOLABILITY: 1
CONSOLABILITY: 1
TOTALSCORE: 1
NEGVOCALIZATION: 1
NEGVOCALIZATION: 1
CONSOLABILITY: 1
FACIALEXPRESSION: 1
TOTALSCORE: 5
NEGVOCALIZATION: 0
TOTALSCORE: 3
BODYLANGUAGE: 1
NEGVOCALIZATION: 1
NEGVOCALIZATION: 1
FACIALEXPRESSION: 0
BREATHING: 0
BREATHING: 0
BODYLANGUAGE: 1
NEGVOCALIZATION: 1
BODYLANGUAGE: 0
BODYLANGUAGE: 0
FACIALEXPRESSION: 0
BREATHING: 0
FACIALEXPRESSION: 0
NEGVOCALIZATION: 0
TOTALSCORE: 2
CONSOLABILITY: 1
BREATHING: 0
BODYLANGUAGE: 0
CONSOLABILITY: 1
FACIALEXPRESSION: 0
BODYLANGUAGE: 1
TOTALSCORE: 3
FACIALEXPRESSION: 1
NEGVOCALIZATION: 0
NEGVOCALIZATION: 1
TOTALSCORE: 3
CONSOLABILITY: 0
CONSOLABILITY: 0
BREATHING: 0
BREATHING: 1
BODYLANGUAGE: 0
TOTALSCORE: 0
FACIALEXPRESSION: 0
BODYLANGUAGE: 0
TOTALSCORE: 0
BODYLANGUAGE: 0
TOTALSCORE: 0
BREATHING: 1
CONSOLABILITY: 1
FACIALEXPRESSION: 1
FACIALEXPRESSION: 0
FACIALEXPRESSION: 0
TOTALSCORE: 5
NEGVOCALIZATION: 1
BREATHING: 0
BODYLANGUAGE: 1
BODYLANGUAGE: 0
CONSOLABILITY: 1
BODYLANGUAGE: 1
NEGVOCALIZATION: 1
FACIALEXPRESSION: 0
BREATHING: 1
NEGVOCALIZATION: 0
BREATHING: 1
NEGVOCALIZATION: 1
TOTALSCORE: 3
FACIALEXPRESSION: 0
TOTALSCORE: 0
BODYLANGUAGE: 1
CONSOLABILITY: 1
CONSOLABILITY: 1
NEGVOCALIZATION: 1
NEGVOCALIZATION: 0
CONSOLABILITY: 0
TOTALSCORE: 2
BODYLANGUAGE: 1
BREATHING: 1
FACIALEXPRESSION: 0
FACIALEXPRESSION: 1
NEGVOCALIZATION: 1
TOTALSCORE: 3
NEGVOCALIZATION: 1
BODYLANGUAGE: 1
FACIALEXPRESSION: 0
FACIALEXPRESSION: 0
CONSOLABILITY: 1
BODYLANGUAGE: 1
CONSOLABILITY: 0
BREATHING: 0
BREATHING: 0
CONSOLABILITY: 0
NEGVOCALIZATION: 1
CONSOLABILITY: 1
FACIALEXPRESSION: 0
NEGVOCALIZATION: 1
BODYLANGUAGE: 1
CONSOLABILITY: 0
CONSOLABILITY: 1
CONSOLABILITY: 0
BREATHING: 0
BODYLANGUAGE: 0
BODYLANGUAGE: 1
CONSOLABILITY: 1
FACIALEXPRESSION: 1
NEGVOCALIZATION: 1
BREATHING: 0
CONSOLABILITY: 0
CONSOLABILITY: 1
TOTALSCORE: 4
BREATHING: 0
TOTALSCORE: 0
TOTALSCORE: 5
FACIALEXPRESSION: 1
BREATHING: 0
TOTALSCORE: 4
CONSOLABILITY: 0
TOTALSCORE: 5
TOTALSCORE: 3
FACIALEXPRESSION: 0
BODYLANGUAGE: 1
BODYLANGUAGE: 1
FACIALEXPRESSION: 2
FACIALEXPRESSION: 1
FACIALEXPRESSION: 0
NEGVOCALIZATION: 1
CONSOLABILITY: 1
TOTALSCORE: 5
BREATHING: 1
CONSOLABILITY: 0
BREATHING: 0
CONSOLABILITY: 0
BREATHING: 0
TOTALSCORE: 2
TOTALSCORE: 0
BODYLANGUAGE: 0
BREATHING: 1
NEGVOCALIZATION: 0
TOTALSCORE: 0
FACIALEXPRESSION: 0
CONSOLABILITY: 1
TOTALSCORE: 5
TOTALSCORE: 3
FACIALEXPRESSION: 0
NEGVOCALIZATION: 0
NEGVOCALIZATION: 1
NEGVOCALIZATION: 1
BODYLANGUAGE: 1
FACIALEXPRESSION: 1
FACIALEXPRESSION: 1
BREATHING: 1
NEGVOCALIZATION: 0
CONSOLABILITY: 1
CONSOLABILITY: 1
BREATHING: 0
NEGVOCALIZATION: 0
BREATHING: 0
FACIALEXPRESSION: 0
FACIALEXPRESSION: 1
FACIALEXPRESSION: 0
BREATHING: 0
BODYLANGUAGE: 1
TOTALSCORE: 5
NEGVOCALIZATION: 1
BODYLANGUAGE: 0
CONSOLABILITY: 0
BODYLANGUAGE: 1
BODYLANGUAGE: 0
BODYLANGUAGE: 1
FACIALEXPRESSION: 1
CONSOLABILITY: 1
BREATHING: 0
BREATHING: 1
TOTALSCORE: 0
BREATHING: 0
CONSOLABILITY: 0
BREATHING: 0
TOTALSCORE: 3
FACIALEXPRESSION: 1
FACIALEXPRESSION: 0
NEGVOCALIZATION: 1
CONSOLABILITY: 0
NEGVOCALIZATION: 1
BREATHING: 0
BREATHING: 0
TOTALSCORE: 5
TOTALSCORE: 5

## 2018-01-01 ASSESSMENT — PAIN DESCRIPTION - LOCATION
LOCATION: HIP
LOCATION: BACK;HIP;LEG
LOCATION: HIP
LOCATION: BACK;HIP;LEG
LOCATION: HIP
LOCATION: HIP;BACK
LOCATION: HIP

## 2018-01-01 ASSESSMENT — PAIN DESCRIPTION - DESCRIPTORS
DESCRIPTORS: ACHING
DESCRIPTORS: ACHING
DESCRIPTORS: ACHING;SHARP;SORE
DESCRIPTORS: ACHING
DESCRIPTORS: ACHING
DESCRIPTORS: ACHING;SORE
DESCRIPTORS: ACHING
DESCRIPTORS: ACHING

## 2018-01-01 ASSESSMENT — PAIN SCALES - GENERAL
PAINLEVEL_OUTOF10: 0
PAINLEVEL_OUTOF10: 3
PAINLEVEL_OUTOF10: 3
PAINLEVEL_OUTOF10: 2
PAINLEVEL_OUTOF10: 10
PAINLEVEL_OUTOF10: 5
PAINLEVEL_OUTOF10: 0
PAINLEVEL_OUTOF10: 5
PAINLEVEL_OUTOF10: 3
PAINLEVEL_OUTOF10: 9
PAINLEVEL_OUTOF10: 8
PAINLEVEL_OUTOF10: 7
PAINLEVEL_OUTOF10: 3
PAINLEVEL_OUTOF10: 4
PAINLEVEL_OUTOF10: 7
PAINLEVEL_OUTOF10: 7
PAINLEVEL_OUTOF10: 3
PAINLEVEL_OUTOF10: 4
PAINLEVEL_OUTOF10: 0
PAINLEVEL_OUTOF10: 9
PAINLEVEL_OUTOF10: 6
PAINLEVEL_OUTOF10: 2
PAINLEVEL_OUTOF10: 4
PAINLEVEL_OUTOF10: 7
PAINLEVEL_OUTOF10: 6
PAINLEVEL_OUTOF10: 0
PAINLEVEL_OUTOF10: 2
PAINLEVEL_OUTOF10: 0
PAINLEVEL_OUTOF10: 5
PAINLEVEL_OUTOF10: 0
PAINLEVEL_OUTOF10: 3
PAINLEVEL_OUTOF10: 4
PAINLEVEL_OUTOF10: 0
PAINLEVEL_OUTOF10: 0
PAINLEVEL_OUTOF10: 4
PAINLEVEL_OUTOF10: 3
PAINLEVEL_OUTOF10: 4
PAINLEVEL_OUTOF10: 6
PAINLEVEL_OUTOF10: 3
PAINLEVEL_OUTOF10: 9
PAINLEVEL_OUTOF10: 6
PAINLEVEL_OUTOF10: 6
PAINLEVEL_OUTOF10: 3
PAINLEVEL_OUTOF10: 2
PAINLEVEL_OUTOF10: 7
PAINLEVEL_OUTOF10: 6
PAINLEVEL_OUTOF10: 8
PAINLEVEL_OUTOF10: 0
PAINLEVEL_OUTOF10: 4
PAINLEVEL_OUTOF10: 6
PAINLEVEL_OUTOF10: 0
PAINLEVEL_OUTOF10: 0
PAINLEVEL_OUTOF10: 2
PAINLEVEL_OUTOF10: 0
PAINLEVEL_OUTOF10: 4
PAINLEVEL_OUTOF10: 7
PAINLEVEL_OUTOF10: 0
PAINLEVEL_OUTOF10: 6
PAINLEVEL_OUTOF10: 6
PAINLEVEL_OUTOF10: 10
PAINLEVEL_OUTOF10: 4
PAINLEVEL_OUTOF10: 5
PAINLEVEL_OUTOF10: 8
PAINLEVEL_OUTOF10: 7
PAINLEVEL_OUTOF10: 0
PAINLEVEL_OUTOF10: 2
PAINLEVEL_OUTOF10: 6
PAINLEVEL_OUTOF10: 4
PAINLEVEL_OUTOF10: 1
PAINLEVEL_OUTOF10: 8
PAINLEVEL_OUTOF10: 7
PAINLEVEL_OUTOF10: 3
PAINLEVEL_OUTOF10: 6
PAINLEVEL_OUTOF10: 3
PAINLEVEL_OUTOF10: 8
PAINLEVEL_OUTOF10: 10
PAINLEVEL_OUTOF10: 0
PAINLEVEL_OUTOF10: 4
PAINLEVEL_OUTOF10: 2
PAINLEVEL_OUTOF10: 4
PAINLEVEL_OUTOF10: 3
PAINLEVEL_OUTOF10: 2
PAINLEVEL_OUTOF10: 2
PAINLEVEL_OUTOF10: 6
PAINLEVEL_OUTOF10: 3
PAINLEVEL_OUTOF10: 4
PAINLEVEL_OUTOF10: 0
PAINLEVEL_OUTOF10: 4
PAINLEVEL_OUTOF10: 1

## 2018-01-01 ASSESSMENT — PAIN DESCRIPTION - PROGRESSION
CLINICAL_PROGRESSION: NOT CHANGED
CLINICAL_PROGRESSION: GRADUALLY IMPROVING
CLINICAL_PROGRESSION: NOT CHANGED
CLINICAL_PROGRESSION: NOT CHANGED
CLINICAL_PROGRESSION: GRADUALLY WORSENING
CLINICAL_PROGRESSION: NOT CHANGED

## 2018-01-01 ASSESSMENT — PULMONARY FUNCTION TESTS
PIF_VALUE: 5
PIF_VALUE: 22
PIF_VALUE: 30
PIF_VALUE: 31
PIF_VALUE: 23
PIF_VALUE: 23
PIF_VALUE: 22
PIF_VALUE: 22
PIF_VALUE: 24
PIF_VALUE: 24
PIF_VALUE: 19
PIF_VALUE: 15
PIF_VALUE: 23
PIF_VALUE: 22
PIF_VALUE: 23
PIF_VALUE: 22
PIF_VALUE: 24
PIF_VALUE: 23
PIF_VALUE: 22
PIF_VALUE: 24
PIF_VALUE: 22
PIF_VALUE: 23
PIF_VALUE: 26
PIF_VALUE: 22
PIF_VALUE: 18
PIF_VALUE: 24
PIF_VALUE: 22
PIF_VALUE: 28
PIF_VALUE: 25
PIF_VALUE: 24
PIF_VALUE: 24
PIF_VALUE: 23
PIF_VALUE: 17
PIF_VALUE: 23
PIF_VALUE: 21
PIF_VALUE: 10
PIF_VALUE: 15
PIF_VALUE: 22
PIF_VALUE: 23
PIF_VALUE: 21
PIF_VALUE: 23
PIF_VALUE: 22
PIF_VALUE: 9
PIF_VALUE: 24
PIF_VALUE: 21
PIF_VALUE: 23
PIF_VALUE: 23
PIF_VALUE: 10
PIF_VALUE: 23
PIF_VALUE: 22
PIF_VALUE: 22
PIF_VALUE: 24
PIF_VALUE: 22
PIF_VALUE: 24
PIF_VALUE: 22
PIF_VALUE: 21
PIF_VALUE: 24
PIF_VALUE: 22
PIF_VALUE: 27
PIF_VALUE: 15
PIF_VALUE: 22
PIF_VALUE: 23
PIF_VALUE: 22
PIF_VALUE: 23
PIF_VALUE: 10
PIF_VALUE: 11
PIF_VALUE: 9
PIF_VALUE: 23
PIF_VALUE: 27
PIF_VALUE: 22
PIF_VALUE: 2
PIF_VALUE: 22
PIF_VALUE: 2
PIF_VALUE: 4
PIF_VALUE: 23
PIF_VALUE: 22
PIF_VALUE: 22
PIF_VALUE: 10
PIF_VALUE: 22
PIF_VALUE: 18
PIF_VALUE: 22
PIF_VALUE: 30
PIF_VALUE: 23

## 2018-01-01 ASSESSMENT — PAIN DESCRIPTION - ORIENTATION
ORIENTATION: LEFT

## 2018-01-01 ASSESSMENT — PAIN DESCRIPTION - ONSET
ONSET: ON-GOING
ONSET: SUDDEN
ONSET: ON-GOING

## 2018-01-01 ASSESSMENT — PAIN DESCRIPTION - PAIN TYPE
TYPE: SURGICAL PAIN
TYPE: ACUTE PAIN
TYPE: ACUTE PAIN
TYPE: SURGICAL PAIN
TYPE: SURGICAL PAIN
TYPE: ACUTE PAIN
TYPE: ACUTE PAIN;SURGICAL PAIN
TYPE: SURGICAL PAIN
TYPE: ACUTE PAIN
TYPE: ACUTE PAIN
TYPE: SURGICAL PAIN
TYPE: ACUTE PAIN;SURGICAL PAIN

## 2018-01-01 ASSESSMENT — PAIN DESCRIPTION - FREQUENCY
FREQUENCY: CONTINUOUS

## 2018-01-01 ASSESSMENT — ENCOUNTER SYMPTOMS: SHORTNESS OF BREATH: 1

## 2018-01-05 NOTE — Clinical Note
Patient Class: Inpatient [101]   REQUIRED: Diagnosis: Oth fracture of unsp femur, init encntr for closed fracture [facility] [[id number]]   Estimated Length of Stay: Estimated stay of more than 2 midnights   Future Attending Provider: Vladislav Barrios [[id number]]   Telemetry Bed Required?: Yes

## 2018-01-06 PROBLEM — I10 HTN (HYPERTENSION): Status: ACTIVE | Noted: 2018-01-01
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
## 2018-01-06 PROBLEM — S72.8X9A OTH FRACTURE OF UNSP FEMUR, INIT ENCNTR FOR CLOSED FRACTURE (HCC): Status: ACTIVE | Noted: 2018-01-01

## 2018-01-06 PROBLEM — S72.002A CLOSED LEFT HIP FRACTURE (HCC): Status: ACTIVE | Noted: 2018-01-01

## 2018-01-06 NOTE — CONSULTS
[address] [place] [address]                                   CONSULTATION    PATIENT NAME: Tanya Leavitt                  :        1933  MED REC NO:   [id number]                           ROOM:       [id number]  ACCOUNT NO:   [de-identified]                           ADMIT DATE: 2018  PROVIDER:     Shanna Felton M.D.    Brendon Oscarur:  2018    REASON OF CONSULTATION:  Preoperative risk assessment for open reduction  and internal fixation of the left femoral neck fracture. HISTORY OF PRESENT ILLNESS:  This is actually an 80-year-old   female. The patient presented to the emergency room, status post fall. The patient has underlying dementia and in the night, she tends to wander  around, and so the patient fell in the night while she was wandering on the  flat surface and she had a fall and called for help and they found her on  the floor and right away, basically, they brought her to the emergency room  and she was complaining of left hip pain. She has history of underlying  dementia. So, here, further evaluation revealed nondisplaced femoral neck  fracture left, and so open reduction and internal fixation was planned and  Cardiology evaluation was sought in that _____. The patient had no known  coronary artery disease, no known stroke, no known chest pain, no known  history of coronary artery disease except that she has sick sinus syndrome  and had a pacemaker. Pacemaker last interrogated, 2017. No fever or chills. Denied any form of chest pain. No orthopnea. No  shortness of breath. REVIEW OF SYSTEMS:  Negative except the above-mentioned ones. PAST MEDICAL HISTORY:  Sick sinus syndrome for which we have the pacemaker,  hypertension, and Alzheimer's dementia. PAST SURGICAL HISTORY:  Hysterectomy and bladder suspension.     FAMILY HISTORY:  No family history of premature coronary artery basically a recent. ASSESSMENT:  1. Preoperative evaluation for open reduction and internal fixation of the  left femoral neck fracture, nondisplaced head. 2.  History of hypertension. 3.  History of sick sinus syndrome. 4.  Status post pacemaker. 5.  Pacemaker interrogation:  Functions normal, at least 07/2017, and with  longevity of 5.5 years. 6.  Pulmonary hypertension. 7.  Right ventricular systolic pressure of 55. PLAN AND RECOMMENDATION:  1. At this level, the patient is hemodynamically stable and she does not  have any chest pain. No shortness of breath and she was active at home  before this incident happened and no acute EKG abnormality. Troponin  negative. Echo:  EF is within normal limits. Device evaluation was  functioning well. So, at this level, I do not think the patient needs any  functional workup or invasive or noninvasive ischemia workup before the  intended surgery. The patient may proceed for the intended surgery. She  is a very high risk patient because of her age itself puts her at high risk  for this intended surgery. So, the patient may proceed for the intended  surgery with moderate to high risk of having major cardiovascular events  preoperatively. She is on optimal medical therapy. We will continue the  current optimal medical therapy and we will follow along. Avoid  tachycardia or hypotension and appropriate hydration and appropriate DVT  prophylaxis preoperatively is recommended. 2.  Call us, Cardiology will be of any further assistance. Thank you for allowing us to participate in the care of this patient. We  will follow up with you. Follow up with Dr. Helene Sandhoff as an outpatient. Corina Perez.  Yessenia Ojeda M.D.    D: 01/06/2018 13:57:46       T: 01/06/2018 15:11:39     [email or web address]  Job#: [id number]     Doc#: [id number]    CC:

## 2018-01-06 NOTE — ED PROVIDER NOTES
bladder suspension. CURRENT MEDICATIONS       Current Discharge Medication List      CONTINUE these medications which have NOT CHANGED    Details   aspirin 81 MG tablet Take 81 mg by mouth daily      LORazepam (ATIVAN) 0.5 MG tablet TAKE 1 TABLET IN THE AM AND 2 QHS. Qty: 90 tablet, Refills: 2    Associated Diagnoses: Agitation      ranitidine (ZANTAC) 150 MG capsule Take 150 mg by mouth 2 times daily      verapamil (CALAN SR) 180 MG extended release tablet Take 1 tablet by mouth nightly 180 mg at bedtime  Qty: 90 tablet, Refills: 3      hydrochlorothiazide (HYDRODIURIL) 12.5 MG tablet Take 1 tablet by mouth daily  Qty: 90 tablet, Refills: 3      amLODIPine (NORVASC) 2.5 MG tablet TAKE 1 TABLET DAILY  Qty: 90 tablet, Refills: 3      potassium chloride (KLOR-CON M) 10 MEQ extended release tablet Indications: take 2 tabs in am and 2 tabs in pm Take 2 tabs BID  Qty: 360 tablet, Refills: 3      ibuprofen (ADVIL;MOTRIN) 400 MG tablet Take 400 mg by mouth every 6 hours as needed for Pain      calcium carbonate (TUMS) 500 MG chewable tablet Take 2 tablets by mouth daily      traMADol (ULTRAM) 50 MG tablet Take 50 mg by mouth 2 times daily as needed for Pain      albuterol (PROVENTIL HFA;VENTOLIN HFA) 108 (90 BASE) MCG/ACT inhaler Inhale 2 puffs into the lungs every 4 hours as needed for Wheezing  Qty: 3 Inhaler, Refills: 3    Associated Diagnoses: Simple chronic bronchitis (HCC)      acetaminophen (TYLENOL) 325 MG tablet Take 650 mg by mouth every 6 hours as needed. ALLERGIES     is allergic to bystolic [nebivolol hcl] and lisinopril. FAMILY HISTORY     indicated that her mother is . She indicated that her father is . family history is not on file. SOCIAL HISTORY      reports that she quit smoking about 30 years ago. Her smoking use included Cigarettes. She quit after 5.00 years of use.  She has never used smokeless tobacco. She reports that she does not drink alcohol or use Hemoglobin 11.8 (*)     Hematocrit 35.9 (*)     MCHC 32.8 (*)     Segs Absolute 7.8 (*)     All other components within normal limits   BASIC METABOLIC PANEL - Abnormal; Notable for the following:     Chloride 96 (*)     All other components within normal limits   ANION GAP   OSMOLALITY   GLOMERULAR FILTRATION RATE, ESTIMATED   URINALYSIS WITH MICROSCOPIC       EMERGENCY DEPARTMENT COURSE:   Vitals:    Vitals:    01/06/18 0104 01/06/18 0212 01/06/18 0359 01/06/18 0515   BP: 138/89 133/63  (!) 152/70   Pulse: 88 79 90 95   Resp: 22 20 19 20   Temp:    97.4 °F (36.3 °C)   TempSrc:    Oral   SpO2: 96% 99% 98% 97%   Weight:    132 lb 1.6 oz (59.9 kg)   Height:    5' 4\" (1.626 m)         CRITICAL CARE:       CONSULTS:  None    PROCEDURES:  None    FINAL IMPRESSION      1. Subcapital fracture of femur, left, closed, initial encounter [facility])          DISPOSITION/PLAN   Admitted patient presenting with complaint of left hip pain status post fall. Family said that most likely mechanical.  Patient has left femur fracture. Case discussed with orthopedics, that like medicine for admit with orthopedic consult.   Patient admitted to medicine    PATIENT REFERRED TO:  Edin Segovia [address], [address] [address]  [phone]            DISCHARGE MEDICATIONS:  Current Discharge Medication List          (Please note that portions of this note were completed with a voice recognition program.  Efforts were made to edit the dictations but occasionally words are mis-transcribed.)    Yg Cobian, [address] [place] [address],   01/06/18 7281

## 2018-01-06 NOTE — CONSULTS
Consult to Cardiology  Consult performed by: Bunny Meek ordered by: Juvenal Kaplan op eval for ORIF of the left Hip Fracture  Left hip fracture  SSS s/p Pacer  HTN      Plan  May proceed to the intended surgery with high risk of having major cardiac event    [id number]    Jose F Hamilton MD

## 2018-01-06 NOTE — H&P
History & Physical        Patient:  Gustavo Rider  YOB: 1933    MRN: [id number]     Acct: [de-identified]    PCP: Yue Rascon DO    Date of Admission: 1/5/2018    Date of Service: Pt seen/examined on 1/6/18and Admitted to Inpatient with expected LOS greater than two midnights due to medical therapy. Chief Complaint:  fall    Source: Er Records and Family  History Of Present Illness:     80 y.o. female who [facility] with CC of Left hip, leg, and back pain after falling. EMS states pt was found at the bottom of some stairs. Left leg shortened and rotated. EMS reports giving 5mg morphine in route with minimal relief. VS, EKG,   Per the family,she felt down on her left side accidentally but did not pass out or hit her head. No preceding chest pain,sob,n/v/d,fever or orthopnea was reported. has some  Non-O2 dependent COPD and a pace maker not any anticoagulation. Past Medical History:          Diagnosis Date    Alzheimer's dementia     Fracture     right hummerus fracture     History of MRSA infection UTI    Hypertension     Migraine     Mild mitral regurgitation by prior echocardiogram     Pacemaker 12/30/09    Medtronic dual pacer    SSS (sick [facility] syndrome) ([facility] [address]     Syncope        Past Surgical History:          Procedure Laterality Date    BLADDER SUSPENSION      HYSTERECTOMY         Medications Prior to Admission:      Prior to Admission medications    Medication Sig Start Date End Date Taking? Authorizing Provider   aspirin 81 MG tablet Take 81 mg by mouth daily   Yes Historical Provider, MD   LORazepam (ATIVAN) 0.5 MG tablet TAKE 1 TABLET IN THE AM AND 2 QHS.  12/13/17  Yes Yue Rascon DO   ranitidine (ZANTAC) 150 MG capsule Take 150 mg by mouth 2 times daily   Yes Historical Provider, MD   verapamil (CALAN SR) 180 MG extended release tablet Take 1 tablet by mouth nightly 180 mg at bedtime 12/12/16  Yes Vika Vee DO degenerative changes of the lumbar spine since the CT of the abdomen and pelvis of December 17, 2016. **This report has been created using voice recognition software. It may contain minor errors which are inherent in voice recognition technology. **      Final report electronically signed by Dr. Charissa Waddell on 1/6/2018 2:19 AM      XR PELVIS (1-2 VIEWS)   Final Result   1. There is a subcapital left femoral neck fracture described on the left femur x-ray study of same day. 2.  No other acute fracture or dislocation. 3.  The pelvic ring looks intact. **This report has been created using voice recognition software. It may contain minor errors which are inherent in voice recognition technology. **      Final report electronically signed by Dr. Charissa Waddell on 1/6/2018 2:09 AM      XR FEMUR LEFT (MIN 2 VIEWS)   Final Result   1. There is a left subcapital femoral neck fracture with proximal and anterior migration of the femur. 2. The left femoral head is not dislocated. **This report has been created using voice recognition software. It may contain minor errors which are inherent in voice recognition technology. **      Final report electronically signed by Dr. Charissa Waddell on 1/6/2018 2:08 AM      CT THORACIC SPINE WO CONTRAST   Final Result   1. No acute appearing fractures of the thoracic vertebrae. 2.  Stable-appearing T12 compression deformity since the CT abdomen and pelvis of December 17, 2016. **This report has been created using voice recognition software. It may contain minor errors which are inherent in voice recognition technology. **      Final report electronically signed by Dr. Charissa Waddell on 1/6/2018 2:06 AM      CT CERVICAL SPINE WO CONTRAST   Final Result   1. No acute fractures or subluxations. 2.  Degenerative changes as described which appear unchanged from prior study of November 27, 2017.                **This report has been created using voice recognition software. It may contain minor errors which are inherent in voice recognition technology. **      Final report electronically signed by Dr. Tay Delacruz on 1/6/2018 1:55 AM      CT HEAD WO CONTRAST   Final Result   1. No acute intracranial hemorrhage, infarction or mass. 2.  Mild mucosal thickening in the ethmoid air cells. 3.  No fractures. **This report has been created using voice recognition software. It may contain minor errors which are inherent in voice recognition technology. **      Final report electronically signed by Dr. Tay Delacruz on 1/6/2018 1:50 AM               DVT prophylaxis: [] Lovenox                                 [x] SCDs                                 [] SQ Heparin                                 [] Encourage ambulation           [] Already on Anticoagulation    Code Status: No Order      PT/OT Eval Status: pending    Disposition:    [] Home       [] TCU       [x] Rehab       [] Psych       [x] SNF       [] [place]       [] Other-    ASSESSMENT:    [address] Problems    Diagnosis Date Noted    [facility] [place] 01/06/2018     Priority: High    Fall [W19. XXXA] 01/06/2018     Priority: High    HTN (hypertension) [I10] 01/06/2018     Priority: Medium    Dementia with behavioral disturbance [F03.91] 11/30/2016     Priority: Medium    COPD (chronic obstructive pulmonary disease) ([facility] [address] [J44.9] 01/19/2016     Priority: Medium    Mitral regurgitation [I34.0] 01/18/2012     Priority: Medium    Medtronic dual pacer [Z95.0]      Priority: Medium    History of MRSA infection [Z86.14] 01/18/2012     Priority: Low     prob UTI  PLAN:  -inpatient  -pain mgt   -Per ortho  -pre-op eval by cards  rocephin iv until urine culture is back  nebs    Thank you Gisella Chaudhary DO for the opportunity to be involved in this patient's care.     Electronically signed by Doris Green MD on 1/6/2018 at 5:27

## 2018-01-07 NOTE — ANESTHESIA PRE PROCEDURE
Department of Anesthesiology  Preprocedure Note       Name:  Helen Babb   Age:  80 y.o.  :  1933                                          MRN:  [id number]         Date:  2018      Surgeon: Machelle Epstein):  Ibeth Truong MD    Procedure: Procedure(s):  HIP HEMIARTHROPLASTY    Medications prior to admission:   Prior to Admission medications    Medication Sig Start Date End Date Taking? Authorizing Provider   aspirin 81 MG tablet Take 81 mg by mouth daily   Yes Historical Provider, MD   LORazepam (ATIVAN) 0.5 MG tablet TAKE 1 TABLET IN THE AM AND 2 QHS. 17  Yes Aron Tejada DO   ranitidine (ZANTAC) 150 MG capsule Take 150 mg by mouth 2 times daily   Yes Historical Provider, MD   verapamil (CALAN SR) 180 MG extended release tablet Take 1 tablet by mouth nightly 180 mg at bedtime 16  Yes Ada Contreras DO   hydrochlorothiazide (HYDRODIURIL) 12.5 MG tablet Take 1 tablet by mouth daily 16  Yes Ada Contreras DO   amLODIPine (NORVASC) 2.5 MG tablet TAKE 1 TABLET DAILY 16  Yes Ada Contreras DO   potassium chloride (KLOR-CON M) 10 MEQ extended release tablet Indications: take 2 tabs in am and 2 tabs in pm Take 2 tabs BID 16  Yes Aron Tejada DO   ibuprofen (ADVIL;MOTRIN) 400 MG tablet Take 400 mg by mouth every 6 hours as needed for Pain    Historical Provider, MD   calcium carbonate (TUMS) 500 MG chewable tablet Take 2 tablets by mouth daily    Historical Provider, MD   traMADol (ULTRAM) 50 MG tablet Take 50 mg by mouth 2 times daily as needed for Pain    Historical Provider, MD   albuterol (PROVENTIL HFA;VENTOLIN HFA) 108 (90 BASE) MCG/ACT inhaler Inhale 2 puffs into the lungs every 4 hours as needed for Wheezing 9/25/15   David Camacho MD   acetaminophen (TYLENOL) 325 MG tablet Take 650 mg by mouth every 6 hours as needed.     Historical Provider, MD       Current medications:    Current Facility-Administered Medications   Medication Dose Route Frequency Provider Last Rate Last Dose    sodium chloride flush 0.9 % injection 10 mL  10 mL Intravenous 2 times per day Lisha Anderson MD   10 mL at 01/06/18 1001    sodium chloride flush 0.9 % injection 10 mL  10 mL Intravenous PRN Lisha Anderson MD        acetaminophen (TYLENOL) tablet 650 mg  650 mg Oral Q4H PRN Lisha Anderson MD   650 mg at 01/07/18 0230    magnesium hydroxide (MILK OF MAGNESIA) 400 MG/5ML suspension 30 mL  30 mL Oral Daily PRN Lisha Anderson MD        [facility]ron [facility] injection 4 mg  4 mg Intravenous Q6H PRN Lisha Anderson MD   4 mg at 01/06/18 1005    HYDROmorphone (DILAUDID) injection 0.5 mg  0.5 mg Intravenous Q3H PRN Lisha Anderson MD   0.5 mg at 01/06/18 1852    amLODIPine (NORVASC) tablet 2.5 mg  2.5 mg Oral Daily Lisha Anderson MD   2.5 mg at 01/06/18 6012    calcium carbonate (TUMS) chewable tablet 1,000 mg  2 tablet Oral Daily Lisha Anderson MD        LORazepam (ATIVAN) tablet 0.5 mg  0.5 mg Oral Q3H PRN Lisha Anderson MD   0.5 mg at 01/07/18 0216    famotidine (PEPCID) tablet 20 mg  20 mg Oral Daily Lisha Anderson MD   20 mg at 01/06/18 0959    verapamil (CALAN SR) extended release tablet 180 mg  180 mg Oral Nightly Lisha Anderson MD   180 mg at 01/06/18 1959       Allergies:     Allergies   Allergen Reactions    Bystolic [Nebivolol Hcl] Other (See Comments)     \"Really bad cough\"    Lisinopril Other (See Comments)     \"Really bad cough\"       Problem List:    Patient Active Problem List   Diagnosis Code    Medtronic dual pacer Z95.0    SSS (sick sinus syndrome) (HCC) I49.5    Mitral regurgitation I34.0    Lower urinary tract infectious disease N39.0    History of MRSA infection Z86.14    Chest pain R07.9    SOB (shortness of breath) R06.02    COPD (chronic obstructive pulmonary disease) ([facility] [address] J44.9    Dementia with behavioral disturbance F03.91    Oth fracture of unsp femur, init encntr for closed fracture ([facility] [address] S72.8X9A    Closed

## 2018-01-07 NOTE — PROGRESS NOTES
Labs      01/06/18   0041  01/07/18   0650   NA  137  135   K  3.7  4.4   CL  96*  97*   CO2  27  32   BUN  20  10   CREATININE  0.6  0.5   CALCIUM  9.2  8.8     No results for input(s): AST, ALT, BILIDIR, BILITOT, ALKPHOS in the last 72 hours. No results for input(s): INR in the last 72 hours. No results for input(s): Angelo Rolando in the last 72 hours. Urinalysis:    Lab Results   Component Value Date    NITRU NEGATIVE 01/06/2018    WBCUA 0-2 01/06/2018    BACTERIA NONE 01/06/2018    RBCUA 5-10 01/06/2018    BLOODU NEGATIVE 01/06/2018    SPECGRAV 1.010 01/06/2018    GLUCOSEU NEGATIVE 12/17/2016       Radiology:  Xr Pelvis (1-2 Views)    Result Date: 1/6/2018  PROCEDURE: XR PELVIS STANDARD CLINICAL INFORMATION: fall, . COMPARISON: No prior study. TECHNIQUE: AP view of the pelvis. FINDINGS: The pelvic ring is intact. The right proximal femur looks normal. There is no obvious dislocation of the proximal right femur. There is a subcapital left femoral neck fracture described on the left femur x-ray series. The superior and inferior pubic rami are intact. The sacrum and sacroiliac joints appear normal. No degenerative changes are noted. 1.  There is a subcapital left femoral neck fracture described on the left femur x-ray study of same day. 2.  No other acute fracture or dislocation. 3.  The pelvic ring looks intact. **This report has been created using voice recognition software. It may contain minor errors which are inherent in voice recognition technology. ** Final report electronically signed by Dr. Dayanara Hull on 1/6/2018 2:09 AM    Xr Hip Left (2-3 Views)    Result Date: 1/7/2018  PROCEDURE: XR HIP LEFT STANDARD CLINICAL INFORMATION: arthroplasty, . Postop total left hip. COMPARISON: 1/6/2018. TECHNIQUE: An AP and a crossfire lateral view of the hip were obtained  of the left hip. FINDINGS: There is now a left hip replacement. The patient has her native acetabulum.  The prosthesis is well contain minor errors which are inherent in voice recognition technology. ** Final report electronically signed by Dr. Deric Edwards on 1/6/2018 2:06 AM    Ct Lumbar Spine Wo Contrast    Result Date: 1/6/2018  PROCEDURE: CT LUMBAR SPINE WO CONTRAST CLINICAL INFORMATION: T/L-SPINE TRAUMA, SIGNIFICANT INJURY SUSPECTED, +/- LOCALIZING SIGNS, trauma fall, . COMPARISON: CT scan of the abdomen and pelvis from December 17, 2016. TECHNIQUE: 3 mm noncontrast axial images were obtained of the lumbar spine. Sagittal and coronal reconstructions were obtained. Angled images were reconstructed through the L3-4, L4-5 and L5-S1 disc levels. All CT scans at this facility use dose modulation, iterative reconstruction, and/or weight-based dosing when appropriate to reduce radiation dose to as low as reasonably achievable. FINDINGS: The T12 compression deformity is present on this study and is described on CT thoracic spine of same date. Please refer to that report. Redemonstrated is posterior subluxation of L2 unchanged of approximately 0.3 cm. Redemonstrated is 0.8 cm anterior subluxation of L5 unchanged. Also unchanged is the appearance of the amount of uncovered disc material which does not encroach upon the central spinal canal. There is moderate bilateral neural foraminal stenosis secondary to the subluxation of L5. This is unchanged. There are no compression fractures. No pars defects are noted. No suspicious osseous lesions are present. 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 The right S2 nerve root can be seen separate from the Tarlov cyst. This does not require follow-up. There are no gross abnormalities within the spinal canal. There are no suspicious findings in the visualized aspects of the retroperitoneum and paraspinal soft tissues. There is severe diverticulosis of the sigmoid colon.  There is mild gaseous distention of the rectum and a few stool balls present within the rectum as well. Moderately dense calcifications of the abdominal aorta and iliac arteries are similar to the recent CT scan of the abdomen and pelvis from December 17, 2016. 1.  No acute fracture or acute subluxation of the lumbar vertebra. 2.  No change in the subluxations and degenerative changes of the lumbar spine since the CT of the abdomen and pelvis of December 17, 2016. **This report has been created using voice recognition software. It may contain minor errors which are inherent in voice recognition technology. ** Final report electronically signed by Dr. Louis Salgado on 1/6/2018 2:19 AM    Xr Chest Portable    Result Date: 1/6/2018  PROCEDURE: XR CHEST PORTABLE CLINICAL INFORMATION: surgery clearence, . COMPARISON: PA and lateral chest x-ray December 17, 2016. TECHNIQUE: AP upright view of the chest. FINDINGS: The heart size is normal.  The mediastinum is not widened. There are no pulmonary infiltrates or effusions. The pulmonary vascularity is normal. No suspicious osseous lesions are present. Stable radiographic appearance of the chest. No evidence of an acute process. **This report has been created using voice recognition software. It may contain minor errors which are inherent in voice recognition technology. ** Final report electronically signed by Dr. Louis Salgado on 1/6/2018 5:03 AM      Diet: DIET GENERAL;     Disposition:    [x] Home       [] TCU       [] Rehab       [] Psych       [x] SNF       [] [place]       [] Other-    Code Status: Full Code    Assessment/Plan:    Active Hospital Problems    Diagnosis Date Noted    Subcapital fracture of femur, left, closed, initial encounter ([facility] [address] Tuan Asai      Priority: High    Closed left hip fracture ([facility] [address] Yovany Garcia 01/06/2018    Fall [J83. XXXA] 01/06/2018    HTN (hypertension) [I10] 01/06/2018    Pre-op evaluation [Z01.818]     Essential hypertension [I10]     Dementia with behavioral disturbance [F03.91] 11/30/2016    COPD (chronic obstructive pulmonary disease) ([facility] [address] [J44.9] 01/19/2016    History of MRSA infection [Z86.14] 01/18/2012    Mitral regurgitation [I34.0] 01/18/2012    Medtronic dual pacer [Z95.0]      L femoral neck fx s/p mechanical fall  PMH of SSS s/p pacer, pulm htn, dementia, COPD    Continue with post-op care per ortho  Trend Hgb, lytes, Cr  BP control  Pain management  PT/OT  CM    Electronically signed by Darryn Munoz MD on 1/7/2018 at 12:11 PM

## 2018-01-07 NOTE — OP NOTE
[facility]  Post-Operative Note    Patient Name:  Lul Cintron  MRN:  [id number] YOB: 1933  Admission Date:  1/5/2018    Surgery Date:  1/7/2018    Pre-operative Diagnosis: Closed displaced femoral neck fx Left      Post-operative Diagnosis: Same    Procedure: open tx femoral neck fracture with a prosthesis (23358) using a unipolar head and a cobalt chrome stem     Surgeon: Audrey Fair MD    Assistants: Darwin Landa MD    Anesthesia: General    Estimated Blood Loss:  989 ml    Complications:  None    Specimens: none    Components Used: [employer]  Conquest fx stem 14 cemented  Unipolar head 47mm with +4 neck length. INDICATION FOR PROCEDURE     The patient is an 80y.o.-year-old with a history of a fall. Patient complained of Left   hip pain. The patient has multiple medical comorbidities and was admitted with a closed displaced femoral neck fracture. Hospitalist was consulted for medical clearance. They felt patient to be of acceptable risk. Discussed with patient and elected to proceed.     NARRATIVE:     PROCEDURE DETAILS     The patient was taken to the operating room, underwent a general anesthetic, placed in lateral position, fracture side up. Care was taken to pad all bony prominences. Timeout was taken, consent was confirmed. Started with a lateral approach to the hip with skin knife followed by electrocautery down to the iliotibial band. The iliotibial band was then split. Charnley C retractor was put in position and took down the anterior third of the gluteus medius tendon. Placed the leg in a figure-of-four, made a cut about a fingerbreadth above the lesser trochanter. We removed the remaining head and neck. Sized the acetabulum to be a size 47. We then used the  followed by the canal finder.  We lateralized, reamed a bit, reamed up to a size 15 and broached to the same and implanted a size 14 cementend stem after preping the canal.

## 2018-01-08 NOTE — PLAN OF CARE
Problem: Pain:  Goal: Pain level will decrease  Pain level will decrease   Outcome: Ongoing  Pt has dementia and can not rate her pain or state a pain goal. Medication given upon family request.   Goal: Control of acute pain  Control of acute pain   Outcome: Completed Date Met: 01/08/18    Goal: Control of chronic pain  Control of chronic pain   Outcome: Completed Date Met: 01/08/18      Problem: Falls - Risk of  Goal: Absence of falls  Outcome: Ongoing  No falls this shift. Patient resting in bed with family at side. Side rails up x 2. Call light within reach. Problem: Risk for Impaired Skin Integrity  Goal: Tissue integrity - skin and mucous membranes  Structural intactness and normal physiological function of skin and  mucous membranes. Outcome: Ongoing  Dressing to left hip incision clean, dry and intact. No other skin breakdown noted. Problem: GI  Goal: No bowel complications  Outcome: Ongoing  No bowel movement this shift. Patient passing gas. Problem:   Goal: Adequate urinary output  Outcome: Ongoing  Patient has low urine output per dhillon. Dr aware. Encourage patient to drink more. Problem: Musculor/Skeletal Functional Status  Goal: Highest potential functional level  Outcome: Ongoing  Pt up with two assist. Patient working with pt/ot. Problem: Discharge Planning:  Goal: Discharged to appropriate level of care  Discharged to appropriate level of care   Outcome: Ongoing  Family would like Rehab at discharge.  and  on. Comments: Care plan reviewed with patient and family. Patient and family verbalize understanding of the plan of care and contribute to goal setting. Aleah Arshad

## 2018-01-08 NOTE — PROGRESS NOTES
commode and with transfers. Activity Tolerance:  Activity Tolerance: Patient limited by fatigue;Patient limited by endurance; Patient limited by pain; Patient limited by cognitive status    Assessment: Body structures, Functions, Activity limitations: Decreased functional mobility , Decreased ROM, Decreased strength, Decreased safe awareness, Decreased cognition, Decreased endurance, Decreased coordination, Decreased balance  Assessment: Patient tolerated session fair,  limited by decreased strength and endurance,  increased pain and also by cognitive status due to history of dementia. Patient required max verbal cues for maintaining NWB on RUE during transfers,  fatigued very quickly. Would benefit from addtional skilled PT to increase strength,endurance, balance and safety for improved functional mobility. Prognosis: Good  REQUIRES PT FOLLOW UP: Yes  Discharge Recommendations: Continue to assess pending progress, Patient would benefit from continued therapy after discharge    Patient Education:  Patient Education: POC, bed mobility,  transfers, gait training with darrel-walker, safety ,  NWB on RUE    Equipment Recommendations: Other: monitor for needs    Safety:  Type of devices:  All fall risk precautions in place, Call light within reach, Chair alarm in place, Left in chair, Patient at risk for falls, Gait belt, Nurse notified    Plan:  Times per week: 7x O/BID  Times per day: Twice a day  Current Treatment Recommendations: Strengthening, Home Exercise Program, ROM, Balance Training, Endurance Training, Functional Mobility Training, Transfer Training, Gait Training, Stair training, Equipment Evaluation, Education, & procurement, Patient/Caregiver Education & Training, Safety Education & Training    Goals:  Patient goals : go home per family    Short term goals  Time Frame for Short term goals: 5 days  Short term goal 1: Pt to be Mod A x 1 for supine <> sit to get in/out of bed  Short term goal 2: Pt to

## 2018-01-08 NOTE — TELEPHONE ENCOUNTER
Ameena Payne called and patient fell and broke her hip and had surgery with Dr. Diomedes Smith and patient won't be able to make 1/30/2018 appt. Wanting to switch from Dr. Michi Bennett to Dr. Tanika Hightower and wanted appt moved out to June due to her recovery. Patient was in need of refills and refill request sent to Dr. Tanika Hightower.

## 2018-01-08 NOTE — PROGRESS NOTES
Cardiology Progress Note      Patient:  Todd Tuttle  YOB: 1933  MRN: [id number]   Acct: [de-identified]  Admit Date:  1/5/2018  Primary Cardiologist: Huma Heredia DO  Seen by dr Sen Aragon    Reason for consult - pre-op  hpi per dr Sen Aragon \"This is actually an 59-year-old   female. The patient presented to the emergency room, status post fall. The patient has underlying dementia and in the night, she tends to wander  around, and so the patient fell in the night while she was wandering on the  flat surface and she had a fall and called for help and they found her on  the floor and right away, basically, they brought her to the emergency room  and she was complaining of left hip pain. She has history of underlying  dementia. So, here, further evaluation revealed nondisplaced femoral neck  fracture left, and so open reduction and internal fixation was planned and  Cardiology evaluation was sought in that _____. The patient had no known  coronary artery disease, no known stroke, no known chest pain, no known  history of coronary artery disease except that she has sick sinus syndrome  and had a pacemaker. Pacemaker last interrogated, 07/2017.     No fever or chills. Denied any form of chest pain. No orthopnea. No  shortness of breath. \"    Subjective (Events in last 24 hours): pt awake and alert. No chest pain or sob.         Objective:   BP (!) 102/59   Pulse 83   Temp 98.1 °F (36.7 °C) (Oral)   Resp 16   Ht 5' 4\" (1.626 m)   Wt 132 lb 1.6 oz (59.9 kg)   SpO2 99%   BMI 22.67 kg/m²        Physical Exam:  General Appearance:awake and alert, in no acute distress  Cardiovascular: normal rate, regular rhythm, normal S1 and S2, no murmurs, rubs, clicks, or gallops, distal pulses intact, no carotid bruits, no JVD  Pulmonary/Chest: clear to auscultation bilaterally- no wheezes, rales or rhonchi, normal air movement, no respiratory distress  Abdomen: soft, non-tender, non-distended, normal bowel 1.9 01/07/2018       PT/INR:    Lab Results   Component Value Date    INR 0.99 06/01/2015       HgBA1c:  No results found for: LABA1C    FLP:  No results found for: TRIG, HDL, LDLCALC, LDLDIRECT, LABVLDL    TSH:    Lab Results   Component Value Date    TSH 2.660 06/01/2015         Assessment:    Pre-op risk assessment  S/p fall with left femoral neck fracture  S/p OR 1/7/18  HTN  Hx PPM secondary SSS  PHTN  dementia    Plan:  · Stop norvasc as this time for lower bp's  · Pt and POA scheduled for appt with dr Chapo Carrero as they want to switch from dr redding  · Pt stable from cariology stand point, will see prn         Electronically signed by Shanna Ledesma PA-C on 1/8/2018 at 11:36 AM

## 2018-01-08 NOTE — CARE COORDINATION
DISCHARGE BARRIERS  1/8/18, 9:24 AM    Reason for Referral:  Discharge needs   Mental Status:  Alert, confused due to dementia  Decision Making: son and daughter in law or DPOAs and make decisions for patient  Family/Social/Home Environment: Dick Dietrich lives at home with her son and daughter in law, who have been providing care. Both work away from home. They are not able to manage her care during recovery  Current Services: none   Current Equipment: four wheeled walker   Payment Source: [employer] medicare   Concerns or Barriers to Discharge: Will need precert for rehab/ecf   Collabrative List of ECF/HH were provided: declined at this time, would [facility]hab, [facility] or [facility]     Teach Back Method used with daughter in law regarding care plan and discharge plan  Patient's son and daughter in law verbalize understanding of the plan of care and contribute to goal setting. Anticipated Needs/Discharge Plan: spoke with patient and daughter in law.   Requesting inpt rehab, second choice [facility]    Electronically signed by NANNETTE Newton on 1/8/2018 at 9:24 AM

## 2018-01-08 NOTE — PROGRESS NOTES
obvious deformity. Pupils equal, round, and reactive to light. Conjunctivae/corneas clear. Neck: Supple, with full range of motion. No jugular venous distention. Trachea midline. Respiratory:  Normal respiratory effort. Clear to auscultation, bilaterally without Rales/Wheezes/Rhonchi. Cardiovascular:  Regular rate and rhythm. Abdomen: Soft, non-tender, non-distended with normal bowel sounds. Musculoskeletal:  No clubbing, cyanosis or edema bilaterally. Full range of motion without deformity. Pt can flex and extend left toes. Left hip incision dry and intact. Skin: Skin color, texture, turgor normal.  No rashes or lesions. Neurologic:  Neurovascularly intact without any focal sensory/motor deficits. Sensation intact. Psychiatric: Thought content appropriate, normal insight  Capillary Refill: Brisk,< 3 seconds   Peripheral Pulses: +2 palpable, equal bilaterally    Data  CBC:   Lab Results   Component Value Date    WBC 9.1 01/07/2018    HGB 9.3 01/08/2018     01/07/2018     BMP:  Lab Results   Component Value Date     01/08/2018    K 4.9 01/08/2018     01/08/2018    CO2 29 01/08/2018    BUN 17 01/08/2018    CREATININE 0.6 01/08/2018    CALCIUM 8.5 01/08/2018    GLUCOSE 134 01/08/2018    GLUCOSE 98 12/06/2016     Uric Acid:  No components found for: URIC  PT/INR:    Lab Results   Component Value Date    INR 0.99 06/01/2015     Troponin:  No results found for: TROPONINI  Urine Culture:  No components found for: RAYA    ASSESSMENT:  C/Luz Melchor 1106 Problems    Diagnosis Date Noted    Subcapital fracture of femur, left, closed, initial encounter ([facility] [address] [S72.012A]      Priority: High    Closed left hip fracture ([facility] [address] [S72.002A] 01/06/2018    Fall [S67. XXXA] 01/06/2018    HTN (hypertension) [I10] 01/06/2018    Pre-op evaluation [Z01.818]     Essential hypertension [I10]     Dementia with behavioral disturbance [F03.91] 11/30/2016    COPD (chronic obstructive pulmonary disease) (HCC) [J44.9] 01/19/2016    History of MRSA infection [Z86.14] 01/18/2012    Mitral regurgitation [I34.0] 01/18/2012    Medtronic dual pacer [Z95.0]        PLAN  1. Dry drsg changes as needed   2. WBAT LLE  3. PT/OT to eval and treat   4. Continue current medical management   5.  Will need ECF placement upon discharge       Electronically signed by Kalyn López CNP on 1/8/2018 at 2:46 PM

## 2018-01-08 NOTE — PROGRESS NOTES
[address] [address]  INPATIENT PHYSICAL THERAPY  EVALUATION  [facility] ORTHOPEDICS 7K    Time In:   Time Out: 1015  Timed Code Treatment Minutes: 9 Minutes  Minutes: 25          Date: 2018  Patient Name: Peg Pena,  Gender:  female        MRN: [id number]  : 1933  (80 y.o.)      Referring Practitioner: KIERSTEN Beltran MD  Diagnosis: Oth fracture of unsp femur, init encntr for closed fracture  Additional Pertinent Hx: Per ED note, pt is a 80 y.o. female who presents with Complaint of left hip pain, patient is reported to have had a fall down a step at home. Patient has history dementia, patient's family states that she must have made her way down 20 steps, because they found her laying at the bottom step, thinks that she stripped on her sleeper after she got all the way down the steps. Past Medical History:   Diagnosis Date    Alzheimer's dementia     Fracture     right hummerus fracture     History of MRSA infection UTI    Hypertension     Migraine     Mild mitral regurgitation by prior echocardiogram     Pacemaker 09    Medtronic dual pacer    SSS [facility]sick [facility] syndrome) ([facility] [address] [facility]     Syncope      Past Surgical History:   Procedure Laterality Date    BLADDER SUSPENSION      HYSTERECTOMY         Restrictions/Precautions:  Restrictions/Precautions: Weight Bearing, General Precautions, Fall Risk    Left Lower Extremity Weight Bearing: Weight Bearing As Tolerated  Right Upper Extremity Weight Bearing: Non Weight Bearing    Position Activity Restriction  Hip Precautions: No hip flexion > 90 degrees, No ADduction, No hip internal rotation, No hip external rotation  Other position/activity restrictions: Right humerus fx - 6 weeks out. Per daughter pt was starting outpatient therapy next week for R humrus fx.         Subjective:  Chart Reviewed: Yes  Patient assessed for rehabilitation services?: Yes  Comments: Pt is s/p R Hemiarthroplasty of Hip on 18 and is 6 wks s/p R humeral All fall risk precautions in place, Bed alarm in place, Call light within reach, Gait belt, Patient at risk for falls, Left in bed, Nurse notified    Plan:  Times per week: 7x O/BID  Times per day: Twice a day  Current Treatment Recommendations: Strengthening, Home Exercise Program, ROM, Balance Training, Endurance Training, Functional Mobility Training, Transfer Training, Gait Training, Stair training, Equipment Evaluation, Education, & procurement, Patient/Caregiver Education & Training, Safety Education & Training    Goals:  Patient goals : go home per family  Short term goals  Time Frame for Short term goals: 5 days  Short term goal 1: Pt to be Mod A x 1 for supine <> sit to get in/out of bed  Short term goal 2: Pt to be Mod A x 1 for sit <> stand to get up to ambulate/transfer to another seat  Short term goal 3: Pt to be Mod A x 1 for stand-pivot with or without AD to get to another seat  Short term goal 4: Pt to negotiate 3 steps with 1 rail with Mod A for home access  Long term goals  Time Frame for Long term goals : not set due to short ELOS    Evaluation Complexity: Based on the findings of patient history, examination, clinical presentation, and decision making during this evaluation, the evaluation of Arturo Turner  is of medium complexity. PT G-Codes  Functional Limitation: Mobility: Walking and moving around  Mobility: Walking and Moving Around Current Status (): At least 60 percent but less than 80 percent impaired, limited or restricted  Mobility: Walking and Moving Around Goal Status (): At least 60 percent but less than 80 percent impaired, limited or restricted       AM-PAC Inpatient Mobility without Stair Climbing Raw Score : 10  AM-PAC Inpatient without Stair Climbing T-Scale Score : 34.07  Mobility Inpatient CMS 0-100% Score: 71.66  Mobility Inpatient without Stair CMS G-Code Modifier : YARIEL Blandon.  Trixie Schafer [place] [address]

## 2018-01-08 NOTE — PROGRESS NOTES
[facility]  INPATIENT OCCUP[facility] THERAPY  [facility] ORTHOPEDICS 7K  EVALUATION    Time:  Time In: 0800  Time Out: 0682  Timed Code Treatment Minutes: 34 Minutes  Minutes: 39    Date: 2018  Patient Name: Cherilynn Mcardle,   Gender: female      MRN: [id number]  : 1933  (80 y.o.)  Referring Practitioner: Xuan Beasley MD  Diagnosis: Fracture of Unspecified Femur. Additional Pertinent Hx: The patient is a 80 y.o. female who presents with left hip pain s/p fall. Per the family who was present for today's evaluation the patient was found down last night and had an unwitnessed fall. She had pain and a shortened, ER left lower extremity. She was brought to the ED where imaging was performed, revealing a closed, left femoral neck fx. Orthopaedic surgery was consulted for further management of the fx and the patient was admitted to the medicine service provided her co-morbidities. Restrictions/Precautions:  Restrictions/Precautions: Weight Bearing, General Precautions, Fall Risk    Left Lower Extremity Weight Bearing: Weight Bearing As Tolerated  Right Upper Extremity Weight Bearing: Non Weight Bearing    Position Activity Restriction  Hip Precautions: No hip flexion > 90 degrees, No ADduction, No hip internal rotation, No hip external rotation  Other position/activity restrictions: Right humerus fx - 6 weeks out. Per daughter pt was starting outpatient therapy next week for R humrus fx.      Past Medical History:   Diagnosis Date    Alzheimer's dementia     Fracture     right hummerus fracture     History of MRSA infection UTI    Hypertension     Migraine     Mild mitral regurgitation by prior echocardiogram     Pacemaker 09    Medtronic dual pacer    SSS (sick sinus syndrome) ([address]     Syncope      Past Surgical History:   Procedure Laterality Date    BLADDER SUSPENSION      HYSTERECTOMY             Subjective  Chart Reviewed: Yes  Patient assessed for rehabilitation services?: Yes  Family / Caregiver Present: No    Subjective: RN okayed OT session. Upon arrival patient was sitting up in bed. Pt was agreeable to OT session. General:  Overall Orientation Status: Impaired  Orientation Level: Oriented to person, Disoriented to place, Disoriented to time, Disoriented to situation    Vision: Impaired    Hearing: Exceptions to [facility]  Hearing Exceptions: Hard of hearing/hearing concerns    Pain:  Pain Assessment  Patient Currently in Pain: Yes  Pain Assessment: Faces  Borges-Baker Pain Rating: Hurts whole lot  Pain Type: Surgical pain  Pain Location: Hip  Pain Orientation: Left     Social/Functional:  Lives With: Son (Son and daughter in-law )  Type of Home: House  Home Layout: One level  Home Access: Stairs to enter with rails  Entrance Stairs - Number of Steps: 3 AWILDA  Home Equipment: 4 wheeled walker     Bathroom Shower/Tub: Tub/Shower unit  Bathroom Toilet: Handicap height  Bathroom Accessibility: Accessible     ADL Assistance: Needs assistance (Family asists with showering tasks, and dressing. )  Homemaking Assistance: Needs assistance (Family assists with cooking, cleaning, and laundry. )  Homemaking Responsibilities: No    Ambulation Assistance: Independent (No AD. )  Transfer Assistance: Independent    Active : No  Occupation: Retired    Objective  Overall Cognitive Status: Exceptions  Arousal/Alertness: Inconsistent responses to stimuli  Following Commands:  Follows one step commands with repetition, Inconsistently follows commands  Attention Span: Difficulty attending to directions  Memory: Decreased short term memory, Decreased recall of recent events  Safety Judgement: Decreased awareness of need for safety  Problem Solving: Decreased awareness of errors, Assistance required to identify errors made, Assistance required to generate solutions, Assistance required to implement solutions, Assistance required to correct errors made  Insights: Decreased awareness of deficits  Initiation: Requires cues for all  Sequencing: Requires cues for all        LUE AROM (degrees)  LUE AROM : WFL     RUE AROM (degrees)  RUE General AROM: Distally WFL-Shoulder not tested d/t humerus fx. LUE Strength  Gross LUE Strength: WFL  L Hand Grasp: 3+/5  L Hand Release: 3+/5    RUE Strength  Gross RUE Strength:  (Not tested. )  R Hand Grasp: 3+/5  R Hand Release: 3+/5    ADL  LE Dressing: Dependent/Total (To don B socks. )     Bed mobility  Supine to Sit: Maximum assistance (x 2 )  Scooting: Moderate assistance (To scoot to EOB. )    Transfers  Stand Pivot Transfers: Moderate assistance (x 2 from EOB to recliner. )  Sit to stand: Moderate assistance (x 2 from EOB with mod vc for technique. )  Stand to sit: Minimal assistance (x 2 onto recliner. )    Balance  Sitting Balance: Contact guard assistance (Sitting EOB. )  Standing Balance: Minimal assistance (with B hand supported on therapist. )     Time: 45 seconds  Activity: Prep for transfer. Activity Tolerance:  Activity Tolerance: Patient limited by fatigue, Patient limited by pain, Treatment limited secondary to decreased cognition    Treatment Initiated:  OT Evaluation completed. See above     Assessment:  Assessment: This 80year old female with h/o dementia and right humerus fx is s/p Left Hip Moses argthroplasty. Pt required skilled OT intervention to increase indep and safety with all self cares, transfers, and functional mobility to decrease fall risk and return to PLOF.    Performance deficits / Impairments: Decreased functional mobility , Decreased ADL status, Decreased strength, Decreased endurance, Decreased safe awareness, Decreased cognition  Prognosis: Fair  Discharge Recommendations: Continue to assess pending progress, Patient would benefit from continued therapy after discharge, Subacute/Skilled Nursing Facility    Clinical Decision Making: Clinical Decision making was of Moderate Complexity as the result of analysis of data

## 2018-01-08 NOTE — DISCHARGE SUMMARY
Hospital Medicine Discharge Summary      Patient Identification:   Tamie Womack   : 1933  MRN: [id number]   Account: [de-identified]      Patient's PCP: Jacqueline Roman DO    Admit Date: 2018     Discharge Date:   2018    Admitting Physician: Jayro Duff MD     Discharge Physician: Violeta Brunett DO     Discharge Diagnoses: Active Hospital Problems    Diagnosis Date Noted    [facility] [S72.012A]      Priority: High    Closed left hip fracture ([facility] [address] Dwight Lewis 2018    Fall [U11. XXXA] 2018    HTN (hypertension) [I10] 2018    Pre-op evaluation [Z01.818]     Essential hypertension [I10]     Dementia with behavioral disturbance [F03.91] 2016    COPD (chronic obstructive pulmonary disease) ([facility] [facility] [J44.9] 2016    History of MRSA infection [Z86.14] 2012    Mitral regurgitation [I34.0] 2012    Medtronic dual pacer [Z95.0]        The patient was seen and examined on day of discharge and this discharge summary is in conjunction with any daily progress note from day of discharge. Hospital Course:   Tamie Womack is a 80 y.o. female admitted to [facility] on 2018 for fall and trimleolar fx repaired.                Exam:     Vitals:  Vitals:    18 0446 18 0845 18 1215 18 1528   BP: (!) 141/77 (!) 102/59 122/61 121/80   Pulse: 90 83 94 88   Resp: 16 16 16 16   Temp: 96.9 °F (36.1 °C) 98.1 °F (36.7 °C)  97.8 °F (36.6 °C)   TempSrc: Axillary Oral  Oral   SpO2: 97% 99% 100% 97%   Weight:       Height:         Weight: Weight: 132 lb 1.6 oz (59.9 kg)     24 hour intake/output:  Intake/Output Summary (Last 24 hours) at 18 1538  Last data filed at 18 1523   Gross per 24 hour   Intake          3440.21 ml   Output              675 ml   Net          2765.21 ml         General appearance:  No apparent distress, appears stated age and **This report has been created using voice recognition software. It may contain minor errors which are inherent in voice recognition technology. **      Final report electronically signed by Dr. Kellie Sanchez on 1/6/2018 5:03 AM      CT LUMBAR SPINE WO CONTRAST   Final Result      1. No acute fracture or acute subluxation of the lumbar vertebra. 2.  No change in the subluxations and degenerative changes of the lumbar spine since the CT of the abdomen and pelvis of December 17, 2016. **This report has been created using voice recognition software. It may contain minor errors which are inherent in voice recognition technology. **      Final report electronically signed by Dr. Kellie Sanchez on 1/6/2018 2:19 AM      XR PELVIS (1-2 VIEWS)   Final Result   1. There is a subcapital left femoral neck fracture described on the left femur x-ray study of same day. 2.  No other acute fracture or dislocation. 3.  The pelvic ring looks intact. **This report has been created using voice recognition software. It may contain minor errors which are inherent in voice recognition technology. **      Final report electronically signed by Dr. Kellie Sanchez on 1/6/2018 2:09 AM      XR FEMUR LEFT (MIN 2 VIEWS)   Final Result   1. There is a left subcapital femoral neck fracture with proximal and anterior migration of the femur. 2. The left femoral head is not dislocated. **This report has been created using voice recognition software. It may contain minor errors which are inherent in voice recognition technology. **      Final report electronically signed by Dr. Kellie Sanchez on 1/6/2018 2:08 AM      CT THORACIC SPINE WO CONTRAST   Final Result   1. No acute appearing fractures of the thoracic vertebrae. 2.  Stable-appearing T12 compression deformity since the CT abdomen and pelvis of December 17, 2016. **This report has been created using voice recognition software.  It

## 2018-01-09 NOTE — CARE COORDINATION
1/9/18, 2:43 PM    [facility] is out of network with a 50% copay. Discussed with daughter in law Imtiaz Burger. She will discuss options with family and is calling [employer] herself.

## 2018-01-09 NOTE — PROGRESS NOTES
CT CERVICAL SPINE WO CONTRAST   Final Result   1. No acute fractures or subluxations. 2.  Degenerative changes as described which appear unchanged from prior study of November 27, 2017. **This report has been created using voice recognition software. It may contain minor errors which are inherent in voice recognition technology. **      Final report electronically signed by Dr. Holly Sorensen on 1/6/2018 1:55 AM      CT HEAD WO CONTRAST   Final Result   1. No acute intracranial hemorrhage, infarction or mass. 2.  Mild mucosal thickening in the ethmoid air cells. 3.  No fractures. **This report has been created using voice recognition software. It may contain minor errors which are inherent in voice recognition technology. **      Final report electronically signed by Dr. Holly Sorensen on 1/6/2018 1:50 AM      XR CHEST STANDARD (2 VW)    (Results Pending)       Diet: DIET GENERAL;    DVT prophylaxis: [] Lovenox                                 [] SCDs                                 [] SQ Heparin                                 [] Encourage ambulation           [] Already on Anticoagulation     Disposition:    [] Home       [] TCU       [] Rehab       [] Psych       [] SNF       [] [place]       [] Other-    Code Status: Full Code    PT/OT Eval Status: yes    Assessment/Plan:new problem this pm some change in contact activity with family ie withdrawn chest clear on right  But  will obtain cxr and observe ecg sinus tach no evidence of any new injury will start rocephin as she may have asperation. Anticipated Discharge in : [facility] than 2 days    Active Hospital Problems    Diagnosis Date Noted    Subcapital fracture of femur, left, closed, initial encounter ([facility] [address] [S72.012A]      Priority: High    Closed left hip fracture ([facility] [address] Joyce Bound 01/06/2018    Fall [C03. XXXA] 01/06/2018    HTN (hypertension) [I10] 01/06/2018    Pre-op evaluation [Z01.818]    

## 2018-01-09 NOTE — PROGRESS NOTES
[address] [address]  INPATIENT PHYSICAL THERAPY  DAILY NOTE  [facility] ORTHOPEDICS 7K    Time In: 930  Time Out: 1030  Timed Code Treatment Minutes: 60 Minutes  Minutes: 60          Date: 2018  Patient Name: Sb Browning,  Gender:  female        MRN: [id number]  : 1933  (80 y.o.)     Referring Practitioner: KIERSTEN Browne MD  Diagnosis: Oth fracture of unsp femur, init encntr for closed fracture  Additional Pertinent Hx: Per ED note, pt is a 80 y.o. female who presents with Complaint of left hip pain, patient is reported to have had a fall down a step at home. Patient has history dementia, patient's family states that she must have made her way down 20 steps, because they found her laying at the bottom step, thinks that she stripped on her sleeper after she got all the way down the steps. Past Medical History:   Diagnosis Date    Alzheimer's dementia     Fracture     right hummerus fracture     History of MRSA infection UTI    Hypertension     Migraine     Mild mitral regurgitation by prior echocardiogram     Pacemaker 09    Medtronic dual pacer    SSS (sick sinus syndrome) ([facility] [address]     Syncope      Past Surgical History:   Procedure Laterality Date    BLADDER SUSPENSION      HYSTERECTOMY      MA PARTIAL HIP REPLACEMENT Left 2018    LEFT HIP HEMIARTHROPLASTY performed by Fletcher Barros MD at [facility]       Restrictions/Precautions:  Restrictions/Precautions: Weight Bearing, General Precautions, Fall Risk    Left Lower Extremity Weight Bearing: Weight Bearing As Tolerated  Right Upper Extremity Weight Bearing: Weight Bearing As Tolerated    Position Activity Restriction  Hip Precautions: No hip flexion > 90 degrees, No ADduction, No hip internal rotation, No hip external rotation  Other position/activity restrictions: Right humerus fx - 6 weeks out. Per daughter pt was starting outpatient therapy next week for R humrus fx. Subjective:     Subjective: RN approved session. Patient in bed upon arrival,  very [facility] and pleasantly confused but did agree to therapy. Tried placing patient on BS commode due to being given a suppository from nursing to try and have a BM. Once sitting on BS commode for about 10-15 minutes, patient reports that she doesn't need to go right now,  assisted patient back to chair to rest.  AFter getting to chair, patient reports now needing to go to the bathroom and have BM,  assisted patient to [facility] and pushed into bathroom,  did stand pivot to get her over to toilet. Patient went back to recliner to rest when finished. Pain:  Yes. Pain Assessment  Pain Assessment: Faces (grimacing and yelling out in pain during mobility)       Social/Functional:  Lives With: Son (Son and daughter in-law )  Type of Home: House  Home Layout: Two level  Home Access: Stairs to enter with rails  Entrance Stairs - Number of Steps: 3 AWILDA  Home Equipment: 4 wheeled walker     Objective:  Supine to Sit: Maximum assistance;Minimal assistance (Max A x1 at trunk and to assist at [place] and also Min. A of another to assist at [place]s)  Sit to Supine: Unable to assess (stayed up in chair at end)  Scooting: Maximal assistance (Max. A x2 to scoot back in recliner;  Mod. A x1 to scoot out on EOB)    Transfers  Sit to Stand: Moderate Assistance (x2 from recliner and EOB;  X2 and Min. Ax1 of another from [facility]; required max verbal cues and manual assist to place hands on arm rest of chair)  Stand to sit: Moderate Assistance (mod. A-max A x2 to multiple surfaces, very impulsive to sit and trying to sit prior to being completely to chair; max verbal cues and assist to reach hands back to surface)  Stand Pivot Transfers: Moderate Assistance (Mod. A-Max x2 using RW for most of the transfers EOB-> BS commode;  recliner <-> WC;  WC <-> toilet (used HHA x2 for this transfer))     Ambulation 1  Surface: level tile  Device: [address]:  Moderate assistance (Mod-Max. x2 )  Quality of Gait:

## 2018-01-09 NOTE — PROGRESS NOTES
Nightly    REVIEW OF SYSTEMS:  Constitutional: Denies any fever, chills. Derm: Denies any rash or skin color change. Eyes: Denies blurred or decreased in vision. Ent: Denies any tinnitus or vertigo. Resp: Denies any cough or shortness of breath. CV: Denies any syncope, palpitations or chest pain. GI:  Denies any abdominal pain, nausea, vomiting, constipation or diarrhea. : Denies any hematuria, hesitancy, or dysuria. Heme/Lymph: Denies any bleeding. Musculoskeletal: Denies numbness and tingling and pain LLE  Neuro: Denies any dizziness, paresthesia or weakness. OBJECTIVE    Patient Vitals for the past 24 hrs:   BP Temp Temp src Pulse Resp SpO2   01/09/18 0819 (!) 134/58 98.4 °F (36.9 °C) Oral 78 18 95 %   01/09/18 0336 133/60 - - 73 18 97 %   01/09/18 0215 - - - - - 93 %   01/09/18 0211 - - - - - (!) 89 %   01/08/18 2347 - - - - - 94 %   01/08/18 2320 (!) 125/54 98.5 °F (36.9 °C) Oral 90 18 90 %   01/08/18 2000 119/62 98.2 °F (36.8 °C) Oral 90 18 91 %   01/08/18 1528 121/80 97.8 °F (36.6 °C) Oral 88 16 97 %     INTAKE/OUTPUT:    Intake/Output Summary (Last 24 hours) at 01/09/18 1443  Last data filed at 01/09/18 1416   Gross per 24 hour   Intake          2872.15 ml   Output             2625 ml   Net           247.15 ml     I/O last 3 completed shifts: In: 2752.2 [P.O.:500; I.V.:2252.2]  Out: 2125 [Urine:2125]    PHYSICAL EXAM:  General appearance:  Alert and confused. No apparent distress, appears stated age and cooperative. HEENT:  Normal cephalic, atraumatic without obvious deformity. Pupils equal, round, and reactive to light. Conjunctivae/corneas clear. Neck: Supple, with full range of motion. No jugular venous distention. Trachea midline. Respiratory:  Normal respiratory effort. Clear to auscultation, bilaterally without Rales/Wheezes/Rhonchi. Cardiovascular:  Regular rate and rhythm. Abdomen: Soft, non-tender, non-distended with normal bowel sounds.   Musculoskeletal:  No clubbing, cyanosis or edema bilaterally. Full range of motion without deformity. Pt can flex and extend left toes. Left hip drsg dry and intact. Skin: Skin color, texture, turgor normal.  No rashes or lesions. Neurologic:  Neurovascularly intact without any focal sensory/motor deficits. Sensation intact. Capillary Refill: Brisk,< 3 seconds   Peripheral Pulses: +2 palpable, equal bilaterally    Data  CBC:   Lab Results   Component Value Date    WBC 9.1 01/07/2018    HGB 8.8 01/09/2018     01/07/2018     BMP:  Lab Results   Component Value Date     01/09/2018    K 4.2 01/09/2018     01/09/2018    CO2 25 01/09/2018    BUN 11 01/09/2018    CREATININE 0.4 01/09/2018    CALCIUM 8.2 01/09/2018    GLUCOSE 103 01/09/2018    GLUCOSE 98 12/06/2016     Uric Acid:  No components found for: URIC  PT/INR:    Lab Results   Component Value Date    INR 0.99 06/01/2015     Troponin:  No results found for: TROPONINI  Urine Culture:  No components found for: RAYA    ASSESSMENT:  SAVANA/Luz Melchor [address] Problems    Diagnosis Date Noted    Subcapital fracture of femur, left, closed, initial encounter ([facility] [S72.012A]      Priority: High    Closed left hip fracture ([facility] [S72.002A] 01/06/2018    Fall [I02. XXXA] 01/06/2018    HTN (hypertension) [I10] 01/06/2018    Pre-op evaluation [Z01.818]     Essential hypertension [I10]     Dementia with behavioral disturbance [F03.91] 11/30/2016    COPD (chronic obstructive pulmonary disease) (HCC) [J44.9] 01/19/2016    History of MRSA infection [Z86.14] 01/18/2012    Mitral regurgitation [I34.0] 01/18/2012    Medtronic dual pacer [Z95.0]        PLAN  1. Dry drsg changes as needed   2. WBAT LLE  3. PT/OT to eval and treat   4. Continue current medical management   5.  May discharge to [facility] when accepted per ortho       Electronically signed by Kelly Bravo CNP on 1/9/2018 at 2:41 PM

## 2018-01-09 NOTE — CARE COORDINATION
1/9/18, 11:38 AM    DISCHARGE BARRIERS      Spoke with [facility] admissions, Matilde, at 8:00 this a.m.. [facility] staff is attempting to determine possible insurance coverage. Call made again to [facility] at 11:00, message left requesting return call. [facility] [place] in network and has beds available, as family's second choice, if family agrees. Precert will be needed for either facility, however, [facility] cannot start precert until decision is made about [facility].

## 2018-01-09 NOTE — PROGRESS NOTES
LEFT HIP HEMIARTHROPLASTY performed by Tabitha Eldridge MD at [facility]: RN ok'ed treatment stating that the pt is getting restless and want to get up. Pain:   Per [facility] chart 4 /10    Objective        Bed mobility  Supine to Sit: Minimal assistance (pt self initiation sitting to edge of bed with Head of bed elevated. x2 persons in room with therapist encouraging pt to sit edge of bed in preaparatrion for transfer with pt at edge of bed while additional person getting chair set up. Pt given skilled instruction on hip precautions seated edge of bed. Pt demonstrates no recall wit hip precautions and requires verbal cueing throughout  )  Scooting: Maximal assistance (max A x 2 peopel to scoot pt back into chair x 2 events. )    Transfers  Stand Pivot Transfers: Moderate assistance (x 2 from bed to recliner. Pt wanting to exit bed form left side. Pt required multiple verbal cues for turning towards chair. Pt demonstrated confusion and stated wanted to go to the bathroom. Pt has dhillon and skilled instruction given on dhillon. Pt required extended time with completion of stand pivot. Pt required verbal cues for alignment wit recliner for safety with transfers)  Sit to stand: Minimal assistance (Min A x 2 from bed in preparation for stand pivot to chair)  Stand to sit: Minimal assistance (Min A x 2 to chair. Pt required short commands to sit into chair.  )       Balance  Sitting Balance: Contact guard assistance (Min A x 2 at edge of chair in preparation for sitting back  into chair. Pt scooting self forward in chair requiring x 2 event for scooting pt back into chair. )    Standing Balance: Minimal assistance (Min A x 2 at walker at edge of bed and chair.)   Activity: Prep for transfer.           Activity Tolerance:   Pt limited secondary to cognition    Assessment:     Performance deficits / Impairments: Decreased functional mobility , Decreased ADL status, Decreased strength, Decreased

## 2018-01-10 NOTE — PROGRESS NOTES
HYDROcodone 5 mg - acetaminophen, HYDROcodone 5 mg - acetaminophen, sodium chloride flush, acetaminophen, magnesium hydroxide, ondansetron, LORazepam      Intake/Output Summary (Last 24 hours) at 01/10/18 1642  Last data filed at 01/10/18 1410   Gross per 24 hour   Intake               20 ml   Output             1550 ml   Net            -1530 ml       Diet:  DIET GENERAL;    Exam:  BP (!) 143/64   Pulse 81   Temp 99.1 °F (37.3 °C) (Axillary)   Resp 16   Ht 5' 4\" (1.626 m)   Wt 132 lb 1.6 oz (59.9 kg)   SpO2 98%   BMI 22.67 kg/m²       Recent Labs      01/08/18   0655  01/09/18   0701  01/10/18   0745   WBC   --    --   5.7   HGB  9.3*  8.8*  8.9*   HCT  27.9*  26.9*  27.0*   PLT   --    --   168     Recent Labs      01/08/18   0655  01/09/18   0701   NA  138  139   K  4.9  4.2   CL  103  104   CO2  29  25   BUN  17  11   CREATININE  0.6  0.4   CALCIUM  8.5  8.2*     No results for input(s): AST, ALT, BILIDIR, BILITOT, ALKPHOS in the last 72 hours. No results for input(s): INR in the last 72 hours. No results for input(s): [place] in the last 72 hours. Urinalysis:    Lab Results   Component Value Date    NITRU NEGATIVE 01/06/2018    WBCUA 0-2 01/06/2018    BACTERIA NONE 01/06/2018    RBCUA 5-10 01/06/2018    BLOODU NEGATIVE 01/06/2018    SPECGRAV 1.010 01/06/2018    GLUCOSEU NEGATIVE 12/17/2016       Radiology:  XR CHEST PORTABLE   Final Result   1. There is no acute cardiopulmonary process. **This report has been created using voice recognition software. It may contain minor errors which are inherent in voice recognition technology. **      Final report electronically signed by Dr. Dee Olsen on 1/10/2018 7:25 AM      XR HIP LEFT (2-3 VIEWS)   Final Result   Status post left hip arthroplasty. **This report has been created using voice recognition software. It may contain minor errors which are inherent in voice recognition technology. **      Final report electronically signed by Dr. Alex Monreal on 1/7/2018 10:27 AM      XR CHEST PORTABLE   Final Result   Stable radiographic appearance of the chest. No evidence of an acute process. **This report has been created using voice recognition software. It may contain minor errors which are inherent in voice recognition technology. **      Final report electronically signed by Dr. Daya Choi on 1/6/2018 5:03 AM      CT LUMBAR SPINE WO CONTRAST   Final Result      1. No acute fracture or acute subluxation of the lumbar vertebra. 2.  No change in the subluxations and degenerative changes of the lumbar spine since the CT of the abdomen and pelvis of December 17, 2016. **This report has been created using voice recognition software. It may contain minor errors which are inherent in voice recognition technology. **      Final report electronically signed by Dr. Daya Choi on 1/6/2018 2:19 AM      XR PELVIS (1-2 VIEWS)   Final Result   1. There is a subcapital left femoral neck fracture described on the left femur x-ray study of same day. 2.  No other acute fracture or dislocation. 3.  The pelvic ring looks intact. **This report has been created using voice recognition software. It may contain minor errors which are inherent in voice recognition technology. **      Final report electronically signed by Dr. Daya Choi on 1/6/2018 2:09 AM      XR FEMUR LEFT (MIN 2 VIEWS)   Final Result   1. There is a left subcapital femoral neck fracture with proximal and anterior migration of the femur. 2. The left femoral head is not dislocated. **This report has been created using voice recognition software. It may contain minor errors which are inherent in voice recognition technology. **      Final report electronically signed by Dr. Daya Choi on 1/6/2018 2:08 AM      CT THORACIC SPINE WO CONTRAST   Final Result   1.   No acute appearing fractures of the thoracic [W19.XXXA] 01/06/2018    HTN (hypertension) [I10] 01/06/2018    Pre-op evaluation [Z01.818]     Essential hypertension [I10]     Dementia with behavioral disturbance [F03.91] 11/30/2016    [facility] [J44.9] 01/19/2016    History of MRSA infection [Z86.14] 01/18/2012    Mitral regurgitation [I34.0] 01/18/2012    Medtronic dual pacer [Z95.0]        1.           Electronically signed by Jaymie Erickson DO on 1/10/2018 at 4:42 PM

## 2018-01-10 NOTE — CARE COORDINATION
1/10/18, 2:49 PM      [facility]  Location: [facility]/026-A Reason for admit: Oth fracture of unsp femur, init encntr for closed fracture ([facility] [S72.8X9A]   Treatment Plan of Care: Dry dressing changes. PT/OT. FWB. Hgb 8.9. IVF. Core Measures: VTE  PCP: Cuauhtemoc Huertas,   Discharge Plan:  Per ortho and hospitalist can d/c to ecf if when accepted.

## 2018-01-10 NOTE — PROGRESS NOTES
Family concerned that patient's oxygen will drop through the night after patient takes PM dose of melatonin. Continuous pulse ox applied per family's request. HR 94 O2 95% on 2 L of oxygen at this time.

## 2018-01-10 NOTE — PLAN OF CARE
Problem: Falls - Risk of  Goal: Absence of falls  Outcome: Met This Shift  Patient remains free from falls this shift, bed alarm in place at all times. Family at bedside. Problem: Risk for Impaired Skin Integrity  Goal: Tissue integrity - skin and mucous membranes  Structural intactness and normal physiological function of skin and  mucous membranes. Outcome: Ongoing  Incision to left hip remains clean dry and intact. Turning patient every 2 hours as tolerated. Problem: Pain Control  Goal: Maintain pain level at or below patient's acceptable level (or 5 if patient is unable to determine acceptable level)  Outcome: Ongoing  Patient receiving prn tylenol and ativan with some relief. Problem: Neurological  Goal: Maximum potential motor/sensory/cognitive function  Outcome: Ongoing  Patient confused to place, time and situation. Problem: Cardiovascular  Goal: No DVT, peripheral vascular complications  Outcome: Met This Shift  Pt without s/s of DVT. Pt able to take prescribed anticoagulants, unable to tolerate SCD;s.     Problem: Respiratory  Goal: O2 Sat > 90%  Outcome: Ongoing  Patient remains on 2L 02 to keep sats above 92%    Problem: GI  Goal: No bowel complications  Outcome: Met This Shift  Patient had medium solid BM this morning,. Problem:   Goal: Adequate urinary output  Outcome: Met This Shift  Patient draining adequate amounts of clear yellow urine per dhillon. Problem: Nutrition  Goal: Optimal nutrition therapy  Outcome: Ongoing  Patient eating small amounts of food with encouragement from family. Problem: Musculor/Skeletal Functional Status  Goal: Highest potential functional level  Outcome: Ongoing  Patient and family participate with PT/OT. Does not tolerate ambulation well. Requires 2-3 assist with use of traci stedy.      Problem: Discharge Planning:  Goal: Discharged to appropriate level of care  Discharged to appropriate level of care   Outcome: Ongoing  Discharge in the

## 2018-01-10 NOTE — PLAN OF CARE
Problem: Falls - Risk of  Goal: Absence of falls  Outcome: Ongoing  Patient in bed with family at bedside. Nonskid slippers on. Bed alarm on. Patient unable to voice understanding on call light use. Problem: Risk for Impaired Skin Integrity  Goal: Tissue integrity - skin and mucous membranes  Structural intactness and normal physiological function of skin and  mucous membranes. Outcome: Ongoing  Pt's left hip surgical incision healing. Red area noted under left eye. Pt family understands the importance of frequent repositioning in order to prevent any skin breakdown. Problem: Pain Control  Goal: Maintain pain level at or below patient's acceptable level (or 5 if patient is unable to determine acceptable level)  Outcome: Ongoing  Pt unable to voice pain goal. Patient in bed resting with eyes closed. Family at bedside. Problem: Neurological  Goal: Maximum potential motor/sensory/cognitive function  Outcome: Ongoing  Alert to name only. 3 assist when getting patient out of bed. Problem: Cardiovascular  Goal: No DVT, peripheral vascular complications  Outcome: Ongoing  Pt without s/s of DVT. SCD'S on to help prevent development of DVT. Problem: Respiratory  Goal: O2 Sat > 90%  Outcome: Ongoing  94% on 2 L  Nasal cannula. Lung sounds diminished. Problem: GI  Goal: No bowel complications  Outcome: Ongoing  Patient with active bowel sounds, passing flatus. Problem:   Goal: Adequate urinary output  Outcome: Ongoing  950 ml urine output of dhillon. Problem: Nutrition  Goal: Optimal nutrition therapy  Outcome: Ongoing  Following ordered diet. Family at bedside to help patient eat. Problem: Musculor/Skeletal Functional Status  Goal: Highest potential functional level  Outcome: Ongoing  Patient up with 3 assist and traci steady. Problem: Discharge Planning:  Goal: Discharged to appropriate level of care  Discharged to appropriate level of care   Outcome: Ongoing  Pt plans ECF at discharge.

## 2018-01-10 NOTE — PROGRESS NOTES
RN spoke with Dr Donato Rivera regarding family concerns of patient increased drowsiness, not eating this shift. Family asking whether a neurology consult should be in place for her Dementia/Alzheimers. Dr Donato Rivera stated that no consult is needed at this time and to give patient DuoNeb inhalation x1. Order placed.

## 2018-01-10 NOTE — CARE COORDINATION
1/10/18, 7:30 AM    DISCHARGE BARRIERS      Spoke with daughter in [place], Imtiaz Burger. She states patient has a [place] and also a [place] Laran[place]. She states she contacted [facility] and was told Ruthy Soto has coverage for out of network facilities through the supplement plan. Neither [facility] or [facility] was able to confirm this, although both are aware of the supplement and have tried to determine out of network coverage. [facility] representative, [phone], contacted  1/9 to discuss placement concerns with out of network facilities and offered in network choices. Will contact Linda Alen again to see if she is aware of any coverage through the supplement. Family is not in agreement with moving ahead with referral to in network facility. *  Spoke with [facility] representative. She is unfamiliar with supplemental policy, but has not found that there is coverage under a supplemental policy when there is a managed care medicare. Family is contacting other [address] [place] [address] representatives to try to resolve their concerns with coverage at out of network facilities.

## 2018-01-10 NOTE — PROGRESS NOTES
Orthopaedic Progress Note      SUBJECTIVE   Ms. Renato Myers is post op day # 3   S/p open tx femoral neck fracture with a prosthesis   Pt has dementia and [place]  Pt is resting in bed, with family at bedside  Pt denies pain  Pt very drowsy, asleep in bed, opens eyes to name  Surgical incision dry and intact  Will need ECF placement    Dr. Catalina Monzon with walker        Allergies:     Allergies as of 01/05/2018 - Review Complete 12/14/2017   Allergen Reaction Noted    Bystolic [nebivolol hcl] Other (See Comments) 01/30/2014    Lisinopril Other (See Comments) 01/30/2014     Current Inpatient Medications:  Current Facility-Administered Medications: bisacodyl (DULCOLAX) suppository 10 mg, 10 mg, Rectal, Daily PRN  cefTRIAXone (ROCEPHIN) 1 g in sterile water 10 mL IV syringe, 1 g, Intravenous, Q24H  melatonin tablet 10 mg, 10 mg, Oral, Nightly PRN  docusate sodium (COLACE) capsule 100 mg, 100 mg, Oral, Daily  senna (SENOKOT) tablet 8.6 mg, 1 tablet, Oral, Nightly  polyethylene glycol (GLYCOLAX) packet 17 g, 17 g, Oral, Daily  HYDROcodone-acetaminophen (NORCO) 5-325 MG per tablet 1 tablet, 1 tablet, Oral, Q4H PRN  HYDROcodone-acetaminophen (NORCO) 5-325 MG per tablet 2 tablet, 2 tablet, Oral, Q4H PRN  enoxaparin (LOVENOX) injection 30 mg, 30 mg, Subcutaneous, Daily  0.9 % sodium chloride bolus, 500 mL, Intravenous, Once  sodium chloride flush 0.9 % injection 10 mL, 10 mL, Intravenous, 2 times per day  sodium chloride flush 0.9 % injection 10 mL, 10 mL, Intravenous, PRN  acetaminophen (TYLENOL) tablet 650 mg, 650 mg, Oral, Q4H PRN  magnesium hydroxide (MILK OF MAGNESIA) 400 MG/5ML suspension 30 mL, 30 mL, Oral, Daily PRN  ondansetron (ZOFRAN) injection 4 mg, 4 mg, Intravenous, Q6H PRN  amLODIPine (NORVASC) tablet 2.5 mg, 2.5 mg, Oral, Daily  calcium carbonate (TUMS) chewable tablet 1,000 mg, 2 tablet, Oral, Daily  LORazepam (ATIVAN) tablet 0.5 mg, 0.5 mg, Oral, Q3H PRN  famotidine (PEPCID) tablet 20 mg, 20 mg, Oral, Daily  verapamil (CALAN SR) extended release tablet 180 mg, 180 mg, Oral, Nightly    REVIEW OF SYSTEMS:  Pt very drowsy  Pt denies pain to LLE    OBJECTIVE    Patient Vitals for the past 24 hrs:   BP Temp Temp src Pulse Resp SpO2   01/10/18 0900 138/62 - - 89 18 97 %   01/10/18 0430 (!) 118/58 97 °F (36.1 °C) Axillary 75 18 99 %   01/10/18 0100 - 99.8 °F (37.7 °C) Axillary - - -   01/10/18 0047 - - - 94 18 97 %   01/09/18 2028 (!) 137/94 96.9 °F (36.1 °C) Axillary 99 16 95 %   01/09/18 1730 136/66 98.7 °F (37.1 °C) Oral 103 20 92 %   01/09/18 1631 - 99.3 °F (37.4 °C) Oral - - -     INTAKE/OUTPUT:    Intake/Output Summary (Last 24 hours) at 01/10/18 0933  Last data filed at 01/10/18 0451   Gross per 24 hour   Intake              120 ml   Output             1675 ml   Net            -1555 ml     I/O last 3 completed shifts: In: 120 [P.O.:120]  Out: 1675 [Urine:1675]    PHYSICAL EXAM:  General appearance:  Awake, drowsy. No apparent distress, appears stated age and cooperative. HEENT:  Normal cephalic, atraumatic without obvious deformity. Pupils equal, round, and reactive to light. Conjunctivae/corneas clear. Neck: Supple, with full range of motion. No jugular venous distention. Trachea midline. Respiratory:  Normal respiratory effort. Clear to auscultation, bilaterally without Rales/Wheezes/Rhonchi. Cardiovascular:  Regular rate and rhythm. Abdomen: Soft, non-tender, non-distended with normal bowel sounds. Musculoskeletal:  No clubbing, cyanosis or edema bilaterally. Full range of motion without deformity. Pt can flex and extend left toes. Left hip drsg dry and intact. Skin: Skin color, texture, turgor normal.  No rashes or lesions. Neurologic:  Neurovascularly intact without any focal sensory/motor deficits. Sensation intact.    Psychiatric: Thought content appropriate, normal insight  Capillary Refill: Brisk,< 3 seconds   Peripheral Pulses: +2 palpable, equal bilaterally    Data  CBC:   Lab Results

## 2018-01-10 NOTE — PROGRESS NOTES
Subacute/Skilled Nursing Facility, ECF with PT    Patient Education:  Patient Education: POC,  PROM, stretches     Equipment Recommendations: Other: monitor for needs    Safety:  Type of devices:  All fall risk precautions in place, Call light within reach, Bed alarm in place, Patient at risk for falls, Left in bed, Nurse notified (Son present in room)    Plan:  Times per week: 7xO  Times per day: Twice a day  Current Treatment Recommendations: Strengthening, Home Exercise Program, ROM, Balance Training, Endurance Training, Functional Mobility Training, Transfer Training, Gait Training, Stair training, Equipment Evaluation, Education, & procurement, Patient/Caregiver Education & Training, Safety Education & Training    Goals:  Patient goals : go home per family    Short term goals  Time Frame for Short term goals: 5 days  Short term goal 1: Pt to be Mod A x 1 for supine <> sit to get in/out of bed  Short term goal 2: Pt to be Mod A x 1 for sit <> stand to get up to ambulate/transfer to another seat  Short term goal 3: Pt to be Mod A x 1 for stand-pivot with or without AD to get to another seat  Short term goal 4: Pt to negotiate 3 steps with 1 rail with Mod A for home access    Long term goals  Time Frame for Long term goals : not set due to short ELOS

## 2018-01-11 NOTE — CARE COORDINATION
1/11/18, 7:57 AM    DISCHARGE BARRIERS      Spoke with daughter in [place], 01/10 at . She reports she is working on insurance changes and has been in contact with Cherise Walls at [facility] to make changes that will allow [facility] to accept. She is hopeful she will have this resolved today. Call made to [facility], admissions and administrative staff not yet available this morning.

## 2018-01-11 NOTE — PROGRESS NOTES
XR CHEST PORTABLE   Final Result   Stable radiographic appearance of the chest. No evidence of an acute process. **This report has been created using voice recognition software. It may contain minor errors which are inherent in voice recognition technology. **      Final report electronically signed by Dr. Binu Henry on 1/6/2018 5:03 AM      CT LUMBAR SPINE WO CONTRAST   Final Result      1. No acute fracture or acute subluxation of the lumbar vertebra. 2.  No change in the subluxations and degenerative changes of the lumbar spine since the CT of the abdomen and pelvis of December 17, 2016. **This report has been created using voice recognition software. It may contain minor errors which are inherent in voice recognition technology. **      Final report electronically signed by Dr. Binu Henry on 1/6/2018 2:19 AM      XR PELVIS (1-2 VIEWS)   Final Result   1. There is a subcapital left femoral neck fracture described on the left femur x-ray study of same day. 2.  No other acute fracture or dislocation. 3.  The pelvic ring looks intact. **This report has been created using voice recognition software. It may contain minor errors which are inherent in voice recognition technology. **      Final report electronically signed by Dr. Binu Henry on 1/6/2018 2:09 AM      XR FEMUR LEFT (MIN 2 VIEWS)   Final Result   1. There is a left subcapital femoral neck fracture with proximal and anterior migration of the femur. 2. The left femoral head is not dislocated. **This report has been created using voice recognition software. It may contain minor errors which are inherent in voice recognition technology. **      Final report electronically signed by Dr. Binu Henry on 1/6/2018 2:08 AM      CT THORACIC SPINE WO CONTRAST   Final Result   1. No acute appearing fractures of the thoracic vertebrae.    2.  Stable-appearing T12 compression deformity since the CT Clear to auscultation, bilaterally without Rales/Wheezes/Rhonchi. Cardiovascular:  Regular rate and rhythm. Abdomen: Soft, non-tender, non-distended with normal bowel sounds. Musculoskeletal:  No clubbing, cyanosis or edema bilaterally. Full range of motion without deformity. Pt can flex and extend left toes. Left hip drsg dry and intact. Skin: Skin color, texture, turgor normal.  No rashes or lesions. Neurologic:  Neurovascularly intact without any focal sensory/motor deficits. Sensation intact. Psychiatric: Thought content appropriate, normal insight  Capillary Refill: Brisk,< 3 seconds   Peripheral Pulses: +2 palpable, equal bilaterally     Anticipated Discharge in :[facility] Problems    Diagnosis Date Noted    Subcapital fracture of femur, left, closed, initial encounter ([facility] [S72.012A]      Priority: High    Closed left hip fracture ([facility] Karen Orgas 01/06/2018    Fall [R73. XXXA] 01/06/2018    HTN (hypertension) [I10] 01/06/2018    Pre-op evaluation [Z01.818]     Essential hypertension [I10]     Dementia with behavioral disturbance [F03.91] 11/30/2016    COPD (chronic obstructive pulmonary disease) ([facility]) [J44.9] 01/19/2016    History of MRSA infection [Z86.14] 01/18/2012    Mitral regurgitation [I34.0] 01/18/2012    Medtronic dual pacer [Z95.0]        1.  Drowsy,stable        Electronically signed by Joshua Grady DO on 1/11/2018 at 3:28 PM

## 2018-01-11 NOTE — PROGRESS NOTES
[facility]  INPATIENT OCCUPATIONAL THERAPY  [facility] ORTHOPEDICS 7K  DAILY NOTE    Time:  Time In: 1440  Time Out: 1503  Timed Code Treatment Minutes: 23 Minutes  Minutes: 23     Date: 2018  Patient Name: Maria Maya,   Gender: female      [facility]  MRN: [id number]  : 1933  (80 y.o.)  Referring Practitioner: Ingrid Randhawa MD  Diagnosis: Fracture of Unspecified Femur. Additional Pertinent Hx: The patient is a 80 y.o. female who presents with left hip pain s/p fall. Per the family who was present for today's evaluation the patient was found down last night and had an unwitnessed fall. She had pain and a shortened, ER left lower extremity. She was brought to the ED where imaging was performed, revealing a closed, left femoral neck fx. Orthopaedic surgery was consulted for further management of the fx and the patient was admitted to the medicine service provided her co-morbidities. Restrictions/Precautions:  Restrictions/Precautions: Weight Bearing, General Precautions, Fall Risk    Left Lower Extremity Weight Bearing: Weight Bearing As Tolerated  Right Upper Extremity Weight Bearing: Weight Bearing As Tolerated    Position Activity Restriction  Hip Precautions: No hip flexion > 90 degrees, No ADduction, No hip internal rotation, No hip external rotation  Other position/activity restrictions: Right humerus fx - 6 weeks out. Per daughter pt was starting outpatient therapy next week for R humrus fx.      Past Medical History:   Diagnosis Date    Alzheimer's dementia     Fracture     right hummerus fracture     History of MRSA infection UTI    Hypertension     Migraine     Mild mitral regurgitation by prior echocardiogram     Pacemaker 09    Medtronic dual pacer    SSS (sick sinus syndrome) ([facility] [address]     Syncope      Past Surgical History:   Procedure Laterality Date    BLADDER SUSPENSION      HYSTERECTOMY      NM PARTIAL HIP REPLACEMENT Left 2018    LEFT HIP discharge, [address]     Patient Education:  Patient Education: Standing endurance and orientation  Barriers to Learning: Cognition     Equipment Recommendations: Other: Monitor needs. Safety:  Safety Devices in place: Yes  Type of devices: All fall risk precautions in place, Gait belt, Call light within reach, Left in chair    Plan:  Times per week: 6x  Current Treatment Recommendations: Endurance Training, Functional Mobility Training, ROM, Safety Education & Training, Patient/Caregiver Education & Training, Equipment Evaluation, Education, & procurement, Self-Care / ADL, Balance Training    Goals:  Patient goals : family reports, Go to rehab and then home    Short term goals  Time Frame for Short term goals: 1 week   Short term goal 1: Pt will complete BUE AROM (excluding R shoulder) with min vc for technique to increase indep with self cares and functional mobility. Short term goal 2: Pt will complete standing tolerance x 2 minutes with min A x 1 and min vc for safety to increase indep with grooming tasks. Short term goal 3: Pt will complete stand piviots to/from various surfaces with min A x 1 and mod vc for safety to increase indep with toileting. Short term goal 4: Pt will sit EOB with CGA x 8 minutes with 1 hand release to increase indep with grooming tasks. Long term goals  Time Frame for Long term goals : No LTGs d/t short estimated length of stay.

## 2018-01-11 NOTE — PLAN OF CARE
PT.    Problem: DISCHARGE BARRIERS  Goal: Patient's continuum of care needs are met  Outcome: Ongoing  Pt planning to be discharged to home with . Intervention: INVOLVE PATIENT/S.O. IN DISCHARGE PLANNING PROCESS  Family involved with discharge planning.

## 2018-01-11 NOTE — PLAN OF CARE
Problem: Falls - Risk of  Goal: Absence of falls  Outcome: Ongoing  No falls this shift. Pt is oriented to name only. Daughter remains at bedside this shift,bed alarm set. Problem: Risk for Impaired Skin Integrity  Goal: Tissue integrity - skin and mucous membranes  Structural intactness and normal physiological function of skin and  mucous membranes. Outcome: Ongoing  Pt turned and repositioned in bed. Surgical incision intact,no drainage. Problem: Pain Control  Goal: Maintain pain level at or below patient's acceptable level (or 5 if patient is unable to determine acceptable level)  Outcome: Ongoing  Taking tylenol as needed. Pt unable to rate pain. Family requests no narcotics. Problem: Neurological  Goal: Maximum potential motor/sensory/cognitive function  Outcome: Ongoing  Pt has history of dementia,oriented only to name. To bathroom with use of traci steady. Problem: Cardiovascular  Goal: No DVT, peripheral vascular complications  Outcome: Ongoing  No s/s of DVT,family requests pt to not wear SCDs. Educated on risk for DVT. Problem: Respiratory  Goal: O2 Sat > 90%  Outcome: Ongoing  Lungs diminished,continuous pulse ox on at night per family request. O2 sats > 90 % on 3 L O2. Problem: GI  Goal: No bowel complications  Outcome: Ongoing  Pt requests \"my bowels need to move\" to bathroom several times,no BM. Once in the bathroom pt denies need to go. bowel medications taken as ordered. Problem:   Goal: Adequate urinary output  Outcome: Ongoing  Treadwell cath in place. Urine output adequate this shift. Problem: Nutrition  Goal: Optimal nutrition therapy  Outcome: Ongoing  Pt has a poor appetite. Daughter assisting pt with fluids and applesauce. Problem: Discharge Planning:  Goal: Discharged to appropriate level of care  Discharged to appropriate level of care   Outcome: Ongoing   assisting with discharge planning. Planning on discharge to ECF.  Family involved with discharge

## 2018-01-12 NOTE — PROGRESS NOTES
in chair    Plan:  Times per week: 6x  Current Treatment Recommendations: Endurance Training, Functional Mobility Training, ROM, Safety Education & Training, Patient/Caregiver Education & Training, Equipment Evaluation, Education, & procurement, Self-Care / ADL, Balance Training    Goals:  Patient goals : family reports, Go to rehab and then home    Short term goals  Time Frame for Short term goals: 1 week   Short term goal 1: Pt will complete BUE AROM (excluding R shoulder) with min vc for technique to increase indep with self cares and functional mobility. Short term goal 2: Pt will complete standing tolerance x 2 minutes with min A x 1 and min vc for safety to increase indep with grooming tasks. Short term goal 3: Pt will complete stand piviots to/from various surfaces with min A x 1 and mod vc for safety to increase indep with toileting. Short term goal 4: Pt will sit EOB with CGA x 8 minutes with 1 hand release to increase indep with grooming tasks. Long term goals  Time Frame for Long term goals : No LTGs d/t short estimated length of stay.      AM-PAC Inpatient Daily Activity Raw Score: 10  AM-PAC Inpatient ADL T-Scale Score : 27.31  ADL Inpatient CMS 0-100% Score: 74.7  ADL Inpatient CMS G-Code Modifier : CL

## 2018-01-12 NOTE — PROGRESS NOTES
PORTABLE   Final Result   Stable radiographic appearance of the chest. No evidence of an acute process. **This report has been created using voice recognition software. It may contain minor errors which are inherent in voice recognition technology. **      Final report electronically signed by Dr. Dafne Srivastava on 1/6/2018 5:03 AM      CT LUMBAR SPINE WO CONTRAST   Final Result      1. No acute fracture or acute subluxation of the lumbar vertebra. 2.  No change in the subluxations and degenerative changes of the lumbar spine since the CT of the abdomen and pelvis of December 17, 2016. **This report has been created using voice recognition software. It may contain minor errors which are inherent in voice recognition technology. **      Final report electronically signed by Dr. Dafne Srivastava on 1/6/2018 2:19 AM      XR PELVIS (1-2 VIEWS)   Final Result   1. There is a subcapital left femoral neck fracture described on the left femur x-ray study of same day. 2.  No other acute fracture or dislocation. 3.  The pelvic ring looks intact. **This report has been created using voice recognition software. It may contain minor errors which are inherent in voice recognition technology. **      Final report electronically signed by Dr. Dafne Srivastava on 1/6/2018 2:09 AM      XR FEMUR LEFT (MIN 2 VIEWS)   Final Result   1. There is a left subcapital femoral neck fracture with proximal and anterior migration of the femur. 2. The left femoral head is not dislocated. **This report has been created using voice recognition software. It may contain minor errors which are inherent in voice recognition technology. **      Final report electronically signed by Dr. Dafne Srivastava on 1/6/2018 2:08 AM      CT THORACIC SPINE WO CONTRAST   Final Result   1. No acute appearing fractures of the thoracic vertebrae.    2.  Stable-appearing T12 compression deformity [facility] CT [facility] Jossue Hahn 01/06/2018    Fall [N49. XXXA] 01/06/2018    HTN (hypertension) [I10] 01/06/2018    Pre-op evaluation [Z01.818]     Essential hypertension [I10]     Dementia with behavioral disturbance [F03.91] 11/30/2016    COPD (chronic obstructive pulmonary disease) (HCC) [J44.9] 01/19/2016    History of MRSA infection [Z86.14] 01/18/2012    Mitral regurgitation [I34.0] 01/18/2012    Medtronic dual pacer [Z95.0]        1.           Electronically signed by Carolyne Sullivan DO on 1/12/2018 at 12:17 PM

## 2018-01-12 NOTE — PROGRESS NOTES
session. Patient in recliner upon arrival,  nursing tech with patient at time due to family stepping for short time,  patient pleasant and agreeable to therapy. Confused throughout session but cooperative. Pain:  Denies. Pain Assessment  Pain Assessment:  (no pain indicated during session)       Social/Functional:  Lives With: Son (Son and daughter in-law )  Type of Home: House  Home Layout: Two level  Home Access: Stairs to enter with rails  Entrance Stairs - Number of Steps: 3 AWILDA  Home Equipment: 4 wheeled walker     Objective:     Transfers  Sit to Stand: Minimal Assistance (x2 from recliner,  max verbal/tactile cues for correct hand placement)  Stand to sit: Minimal Assistance (x2 to recliner, max. verbal cues for reaching back before sitting but sat before doing this)     Ambulation 1  Surface: level tile  Device: Rolling Walker  Other Apparatus: O2  Assistance: Minimal assistance;Contact guard assistance (Min. A x1 and fluctuated between CGA-Min. A of another)  Quality of Gait: Narrow ALLY with cues for spreading feet apart,  decreased step length on Chan LEs,  shuffled step on RLE due to decreased stance time on LLE,  no heel strike on Chan LEs, mild forward flexed posture when going foward and slightly leaning posteriorly when backing up to chair. Min. A to guide walker at times. Distance: 4-5 ft. fwd and backward       Balance  Comments: Patient stood at [address] [place] [address] to work on standing tolerance and improving erect posture when up,  Required CGA x2 while standing statically at walker;  Dynamic stand with RW at [address] A x1 and CGA-Min. A of another while taking steps and weight-shifting. Unsteady but no LOB noted. Exercises:  Exercises  Comments: Patient completed seated Chan LE heel/toe raises x10,  long arc quads x10; reclined hip ABD/ADD and heel slides x10 reps each; Patient needing min.  A to complete heel slides, hip ABD/ADD on left side, also needed demonstrations throughout exercises to follow along due to dementia. All for increased strength/endurance for improved functional mobility. Activity Tolerance:  Activity Tolerance: Patient Tolerated treatment well;Patient limited by cognitive status; Patient limited by endurance    Assessment: Body structures, Functions, Activity limitations: Decreased functional mobility , Decreased ROM, Decreased strength, Decreased safe awareness, Decreased cognition, Decreased endurance, Decreased coordination, Decreased balance  Assessment: Patient tolerated session fairly well today,  more alert and following cues much better,  still requires short, concise commands for comprehension due to severe dementia and being [place]. Patient is limited by decreased strength, endurance balance and safety and would benefit from additional skilled PT to increase strength/endurance for improved functional mobility. Prognosis: Good  REQUIRES PT FOLLOW UP: Yes  Discharge Recommendations: Continue to assess pending progress, Patient would benefit from continued therapy after discharge, Subacute/Skilled Nursing Facility, ECF with PT    Patient Education:  Patient Education: POC, transfers, standing tolerance, gait training with RW,  therex     Equipment Recommendations: Other: monitor for needs    Safety:  Type of devices:  All fall risk precautions in place, Call light within reach, Chair alarm in place, Gait belt, Patient at risk for falls, Nurse notified, Left in chair (Son present when exiting room)    Plan:  Times per week: 7xO  Times per day: Twice a day  Current Treatment Recommendations: Strengthening, Home Exercise Program, ROM, Balance Training, Endurance Training, Functional Mobility Training, Transfer Training, Gait Training, Stair training, Equipment Evaluation, Education, & procurement, Patient/Caregiver Education & Training, Safety Education & Training    Goals:  Patient goals : go home per family    Short term goals  Time Frame for Short term goals: 5 days  Short term

## 2018-01-12 NOTE — PLAN OF CARE
Problem: Falls - Risk of  Goal: Absence of falls  Outcome: Ongoing  No falls this shift. pts son sat bedside this shift. Fall precautions in place. Hourly rounding completed thi shift.     Problem: Risk for Impaired Skin Integrity  Goal: Tissue integrity - skin and mucous membranes  Structural intactness and normal physiological function of skin and  mucous membranes. Outcome: Ongoing  Turned and repositioned in bed frequently. Surgical incision to left hip with dry dressing in place.     Problem: Pain Control  Goal: Maintain pain level at or below patient's acceptable level (or 5 if patient is unable to determine acceptable level)  Outcome: Ongoing  Taking tylenol for surgical pain. Family requests no narcotics to be given. unable to rate pain due to dementia.      Problem: Neurological  Goal: Maximum potential motor/sensory/cognitive function  Outcome: Ongoing  Remains in bed this shift. Oriented to name only which is pt norm due to dementia.     Problem: Cardiovascular  Goal: No DVT, peripheral vascular complications  Outcome: Ongoing  No s/s of DVT. Pt wearing SCds this shift.     Problem: Respiratory  Goal: O2 Sat > 90%  Outcome: Ongoing  continous pulse ox in place this shift. Requires O2 at 2 L to mainatain sats> 90%.     Problem: GI  Goal: No bowel complications  Outcome: Ongoing  Abdomen soft with active bowel sounds.  Pt had a large BM THursday.     Problem:   Goal: Adequate urinary output  Outcome: Ongoing  Treadwell cath in place,urine output adequate this shift.     Problem: Nutrition  Goal: Optimal nutrition therapy  Outcome: Ongoing  Appetite poor,given applesauce this shift,drinks water with assistance.     Problem: Musculor/Skeletal Functional Status  Goal: Highest potential functional level  Outcome: Ongoing  Pt working with PT to regain mobility.     Problem: Discharge Planning:  Goal: Discharged to appropriate level of care  Discharged to appropriate level of care   Outcome: Ongoing  Planning discharge to ECF, assisting family with discharge planning.  Family involved with planning

## 2018-01-12 NOTE — PLAN OF CARE
Problem: Falls - Risk of  Goal: Absence of falls  Outcome: Ongoing  No falls this shift. pts son sat bedside this shift. Fall precautions in place. Hourly rounding completed thi shift. Problem: Risk for Impaired Skin Integrity  Goal: Tissue integrity - skin and mucous membranes  Structural intactness and normal physiological function of skin and  mucous membranes. Outcome: Ongoing  Turned and repositioned in bed frequently. Surgical incision to left hip with dry dressing in place. Problem: Pain Control  Goal: Maintain pain level at or below patient's acceptable level (or 5 if patient is unable to determine acceptable level)  Outcome: Ongoing  Taking tylenol for surgical pain. Family requests no narcotics to be given. unable to rate pain due to dementia. Problem: Neurological  Goal: Maximum potential motor/sensory/cognitive function  Outcome: Ongoing  Remains in bed this shift. Oriented to name only which is pt norm due to dementia. Problem: Cardiovascular  Goal: No DVT, peripheral vascular complications  Outcome: Ongoing  No s/s of DVT. Pt wearing SCds this shift. Problem: Respiratory  Goal: O2 Sat > 90%  Outcome: Ongoing  continous pulse ox in place this shift. Requires O2 at 2 L to mainatain sats> 90%. Problem: GI  Goal: No bowel complications  Outcome: Ongoing  Abdomen soft with active bowel sounds. Pt had a large BM THursday. Problem:   Goal: Adequate urinary output  Outcome: Ongoing  Treadwell cath in place,urine output adequate this shift. Problem: Nutrition  Goal: Optimal nutrition therapy  Outcome: Ongoing  Appetite poor,given applesauce this shift,drinks water with assistance. Problem: Musculor/Skeletal Functional Status  Goal: Highest potential functional level  Outcome: Ongoing  Pt working with PT to regain mobility.     Problem: Discharge Planning:  Goal: Discharged to appropriate level of care  Discharged to appropriate level of care   Outcome: Ongoing  Planning discharge to

## 2018-01-12 NOTE — CARE COORDINATION
1/12/18, 1:46 PM    DISCHARGE BARRIERS      Spoke with Ricarda Gann [facility]. Facility has not received precert from [facility] yet. [facility] staff will notify  when precert is complete.

## 2018-01-13 NOTE — PROGRESS NOTES
auscultation, bilaterally without Rales/Wheezes/Rhonchi. Cardiovascular: Regular rate and rhythm with normal S1/S2 without murmurs, rubs or gallops. Abdomen: Soft, non-tender, non-distended with normal bowel sounds. Musculoskeletal: No clubbing, cyanosis or edema bilaterally. Full range of motion without deformity. Skin: Skin color, texture, turgor normal.  No rashes or lesions. Neurologic:  Neurovascularly intact without any focal sensory/motor deficits. Cranial nerves: II-XII intact, grossly non-focal.  Psychiatric: Alert and oriented, thought content appropriate, normal insight  Capillary Refill: Brisk,< 3 seconds   Peripheral Pulses: +2 palpable, equal bilaterally       Labs:   No results for input(s): WBC, HGB, HCT, PLT in the last 72 hours. No results for input(s): NA, K, CL, CO2, BUN, CREATININE, CALCIUM, PHOS in the last 72 hours. Invalid input(s): MAGNES  No results for input(s): AST, ALT, BILIDIR, BILITOT, ALKPHOS in the last 72 hours. No results for input(s): INR in the last 72 hours. No results for input(s): Aditya Height in the last 72 hours. Urinalysis:    Lab Results   Component Value Date    NITRU NEGATIVE 01/06/2018    WBCUA 0-2 01/06/2018    BACTERIA NONE 01/06/2018    RBCUA 5-10 01/06/2018    BLOODU NEGATIVE 01/06/2018    SPECGRAV 1.010 01/06/2018    GLUCOSEU NEGATIVE 12/17/2016       Radiology:  XR CHEST PORTABLE   Final Result   1. There is no acute cardiopulmonary process. **This report has been created using voice recognition software. It may contain minor errors which are inherent in voice recognition technology. **      Final report electronically signed by Dr. Jerad Soto on 1/10/2018 7:25 AM      XR HIP LEFT (2-3 VIEWS)   Final Result   Status post left hip arthroplasty. **This report has been created using voice recognition software. It may contain minor errors which are inherent in voice recognition technology. **      Final report electronically signed by Dr. Samantha Mock on 1/7/2018 10:27 AM      XR CHEST PORTABLE   Final Result   Stable radiographic appearance of the chest. No evidence of an acute process. **This report has been created using voice recognition software. It may contain minor errors which are inherent in voice recognition technology. **      Final report electronically signed by Dr. Holly Sorensen on 1/6/2018 5:03 AM      CT LUMBAR SPINE WO CONTRAST   Final Result      1. No acute fracture or acute subluxation of the lumbar vertebra. 2.  No change in the subluxations and degenerative changes of the lumbar spine since the CT of the abdomen and pelvis of December 17, 2016. **This report has been created using voice recognition software. It may contain minor errors which are inherent in voice recognition technology. **      Final report electronically signed by Dr. Holly Sorensen on 1/6/2018 2:19 AM      XR PELVIS (1-2 VIEWS)   Final Result   1. There is a subcapital left femoral neck fracture described on the left femur x-ray study of same day. 2.  No other acute fracture or dislocation. 3.  The pelvic ring looks intact. **This report has been created using voice recognition software. It may contain minor errors which are inherent in voice recognition technology. **      Final report electronically signed by Dr. Holly Sorensen on 1/6/2018 2:09 AM      XR FEMUR LEFT (MIN 2 VIEWS)   Final Result   1. There is a left subcapital femoral neck fracture with proximal and anterior migration of the femur. 2. The left femoral head is not dislocated. **This report has been created using voice recognition software. It may contain minor errors which are inherent in voice recognition technology. **      Final report electronically signed by Dr. Holly Sorensen on 1/6/2018 2:08 AM      CT THORACIC SPINE WO CONTRAST   Final Result   1.   No acute appearing fractures of the thoracic vertebrae. 2.  Stable-appearing T12 compression deformity since the CT abdomen and pelvis of December 17, 2016. **This report has been created using voice recognition software. It may contain minor errors which are inherent in voice recognition technology. **      Final report electronically signed by Dr. Gisele Miguel on 1/6/2018 2:06 AM      CT CERVICAL SPINE WO CONTRAST   Final Result   1. No acute fractures or subluxations. 2.  Degenerative changes as described which appear unchanged from prior study of November 27, 2017. **This report has been created using voice recognition software. It may contain minor errors which are inherent in voice recognition technology. **      Final report electronically signed by Dr. Gisele Miguel on 1/6/2018 1:55 AM      CT HEAD WO CONTRAST   Final Result   1. No acute intracranial hemorrhage, infarction or mass. 2.  Mild mucosal thickening in the ethmoid air cells. 3.  No fractures. **This report has been created using voice recognition software. It may contain minor errors which are inherent in voice recognition technology. **      Final report electronically signed by Dr. Gisele Miguel on 1/6/2018 1:50 AM          Diet: DIET GENERAL;  Dietary Nutrition Supplements: Standard High Calorie Oral Supplement    DVT prophylaxis: [] Lovenox                                 [] SCDs                                 [] SQ Heparin                                 [] Encourage ambulation           [] Already on Anticoagulation     Disposition:    [] Home       [] TCU       [] Rehab       [] Psych       [] SNF       [] [place]       [] Other-    Code Status: DNR-CCA    PT/OT Eval Status: yes    Assessment/Plan:  Status pos femur fx with rehab   Dementia ongoing   Prognoses poor overall  Needs to have ecf placement    Anticipated Discharge in : this week     Active Hospital Problems    Diagnosis Date Noted    Subcapital fracture of femur, left, closed, initial encounter ([facility] [S72.012A]      Priority: High    [facility] simple [facility]ur[facility] [J41.8]     Closed [facility] fracture ([facility] Soledad Royht 01/06/2018    Fall [W19. XXXA] 01/06/2018    HTN (hypertension) [I10] 01/06/2018    Pre-op evaluation [Z01.818]     Essential hypertension [I10]     Dementia with behavioral disturbance [F03.91] 11/30/2016    COPD (chronic obstructive pulmonary disease) (HCC) [J44.9] 01/19/2016    History of MRSA infection [Z86.14] 01/18/2012    Mitral regurgitation [I34.0] 01/18/2012    Medtronic dual pacer [Z95.0]        1. No change.          Electronically signed by Homa Kiran DO on 1/13/2018 at 5:38 PM

## 2018-01-13 NOTE — PLAN OF CARE
Problem: Falls - Risk of  Goal: Absence of falls  Outcome: Ongoing  Pt's family using call light appropriately to call for assistance with ambulation to the bathroom and to chair. Pt is also compliant with use of non-skid slippers. Pt's family reports understanding of fall prevention when discussed. Patient has family in room at all times. Problem: Risk for Impaired Skin Integrity  Goal: Tissue integrity - skin and mucous membranes  Structural intactness and normal physiological function of skin and  mucous membranes. Outcome: Ongoing  Pt's left hip surgical incision healing and open to air. No other skin impairments noted. Pt's family understands the importance of frequent repositioning in order to prevent any skin breakdown. Problem: Pain Control  Goal: Maintain pain level at or below patient's acceptable level (or 5 if patient is unable to determine acceptable level)  Outcome: Ongoing  Pt unable to report pain on scale. Using PAINAD. Pt taking prn oral medication helping to achieve pain. Family states a goal of a 5 on scale. Problem: Neurological  Goal: Maximum potential motor/sensory/cognitive function  Outcome: Ongoing  Alert to name. Full movement of arms and legs. Up to [address] stedy with 2 assist.     Problem: Cardiovascular  Goal: No DVT, peripheral vascular complications  Outcome: Ongoing  Pt without s/s of DVT. Pt able to take prescribed anticoagulants. SCD'S in place to help prevent development of DVT. Problem: Respiratory  Goal: O2 Sat > 90%  Outcome: Ongoing  Pt sats 89 on room air. Administered 2 LO2 per nasal cannula. O2 sats up to 97%. No shortness of breath noted. Lungs diminished. Problem: GI  Goal: No bowel complications  Outcome: Ongoing  Pt with bowel sounds, passing flatus, and without nausea. Taking prescribed medications to assist with BM. Medium BM on 1-12-18.     Problem:   Goal: Adequate urinary output  Outcome: Ongoing  Pt voiding adequate amounts of yellow, clear urine in dhillon. Problem: Nutrition  Goal: Optimal nutrition therapy  Outcome: Ongoing  Patient on general diet. Taking in adequate amounts of nutrition. Problem: Musculor/Skeletal Functional Status  Goal: Highest potential functional level  Outcome: Ongoing  Up with 2 assist and traci pereira. Problem: Discharge Planning:  Goal: Discharged to appropriate level of care  Discharged to appropriate level of care   Outcome: Ongoing  Pt plans ECF at discharge. Care manager and social working helping with discharge needs. Comments: Care plan reviewed with patient and family. Patient and family verbalize understanding of the plan of care and contribute to goal setting.

## 2018-01-13 NOTE — PROGRESS NOTES
[facility]  [facility] ORTHOPEDICS 7K  DAILY NOTE    Time:  Time In: 5695  Time Out: 1140  Timed Code Treatment Minutes: 23 Minutes  Minutes: 23        Date: 2018  Patient Name: Michel Gerardo,   Gender: female      Room: [facility]26/026-A  MRN: [id number]  : 1933  (80 y.o.)  Referring Practitioner: Tabitha Eldridge MD  Diagnosis: Fracture of Unspecified Femur. Additional Pertinent Hx: The patient is a 80 y.o. female who presents with left hip pain s/p fall. Per the family who was present for today's evaluation the patient was found down last night and had an unwitnessed fall. She had pain and a shortened, ER left lower extremity. She was brought to the ED where imaging was performed, revealing a closed, left femoral neck fx. Orthopaedic surgery was consulted for further management of the fx and the patient was admitted to the medicine service provided her co-morbidities. Restrictions/Precautions:  Restrictions/Precautions: Weight Bearing, General Precautions, Fall Risk    Left Lower Extremity Weight Bearing: Weight Bearing As Tolerated  Right Upper Extremity Weight Bearing: Weight Bearing As Tolerated    Position Activity Restriction  Hip Precautions: No hip flexion > 90 degrees, No ADduction, No hip internal rotation, No hip external rotation  Other position/activity restrictions: Right humerus fx - 6 weeks out. Per daughter pt was starting outpatient therapy next week for R humrus fx.         Past Medical History:   Diagnosis Date    Alzheimer's dementia     Fracture     right hummerus fracture     History of MRSA infection UTI    Hypertension     Migraine     Mild mitral regurgitation by prior echocardiogram     Pacemaker 09    Medtronic dual pacer    [facility]     Syncope      Past Surgical History:   Procedure Laterality Date    BLADDER SUSPENSION      HYSTERECTOMY      OR PARTIAL HIP REPLACEMENT Left 2018    LEFT Modifier : CL

## 2018-01-13 NOTE — PROGRESS NOTES
Decreased safe awareness, Decreased cognition, Decreased endurance, Decreased coordination, Decreased balance  Assessment: pt had difficulty following commands for activity and required max cues to complete task, used lift equipment to get her into the bathroom this date unable to get her to complete any stepping in traci steady, pt would benfit from cont therapy to work on strength balance and increaesed independence wtih functional mobility   Prognosis: Good  REQUIRES PT FOLLOW UP: Yes  Discharge Recommendations: Continue to assess pending progress, Patient would benefit from continued therapy after discharge, [address] [facility] with PT    Patient Education:  Patient Education: transfers with traci steady    Equipment Recommendations: Other: monitor for needs    Safety:  Type of devices:  All fall risk precautions in place, Call light within reach, Chair alarm in place, Gait belt, Patient at risk for falls, Nurse notified, Left in chair (Son present when exiting room)    Plan:  Times per week: 7xO  Times per day: Twice a day  Current Treatment Recommendations: Strengthening, Home Exercise Program, ROM, Balance Training, Endurance Training, Functional Mobility Training, Transfer Training, Gait Training, Stair training, Equipment Evaluation, Education, & procurement, Patient/Caregiver Education & Training, Safety Education & Training    Goals:  Patient goals : go home per family    Short term goals  Time Frame for Short term goals: 5 days  Short term goal 1: Pt to be Mod A x 1 for supine <> sit to get in/out of bed  Short term goal 2: Pt to be Mod A x 1 for sit <> stand to get up to ambulate/transfer to another seat  Short term goal 3: Pt to be Mod A x 1 for stand-pivot with or without AD to get to another seat  Short term goal 4: Pt to negotiate 3 steps with 1 rail with Mod A for home access    Long term goals  Time Frame for Long term goals : not set due to short ELOS

## 2018-01-14 NOTE — PROGRESS NOTES
Skin assessed under allevyn. Small rash/bruise looking spot found underneath area. Allevyn removed from sacrum.

## 2018-01-14 NOTE — PROGRESS NOTES
Hospitalist Progress Note    Patient:  Sasha Arrington      Unit/Bed:7K-26/026-A    YOB: 1933    MRN: [id number]       Acct: [de-identified]     PCP: Alejandro Martinez DO    Date of Admission: 1/5/2018    Chief Complaint: unable to verbalize     Hospital Course: cognition poor and she sleeps most of the day and eats 50% of her food, fx healing with cast. Chest cta in no acd. Subjective: send to ecf when ortho and care management advises. Medications:  Reviewed    Infusion Medications    lactated ringers 75 mL/hr at 01/11/18 6851     Scheduled Medications    rivaroxaban  10 mg Oral Daily    megestrol  200 mg Oral Daily    docusate sodium  100 mg Oral Daily    senna  1 tablet Oral Nightly    polyethylene glycol  17 g Oral Daily    sodium chloride  500 mL Intravenous Once    sodium chloride flush  10 mL Intravenous 2 times per day    amLODIPine  2.5 mg Oral Daily    calcium carbonate  2 tablet Oral Daily    famotidine  20 mg Oral Daily    verapamil  180 mg Oral Nightly     PRN Meds: bisacodyl, melatonin, HYDROcodone 5 mg - acetaminophen, HYDROcodone 5 mg - acetaminophen, sodium chloride flush, acetaminophen, magnesium hydroxide, ondansetron, LORazepam      Intake/Output Summary (Last 24 hours) at 01/14/18 1655  Last data filed at 01/14/18 1526   Gross per 24 hour   Intake              150 ml   Output             1000 ml   Net             -850 ml       Diet:  DIET GENERAL;  Dietary Nutrition Supplements: Standard High Calorie Oral Supplement    Exam:  /87   Pulse 92   Temp 98.1 °F (36.7 °C) (Oral)   Resp 18   Ht 5' 4\" (1.626 m)   Wt 132 lb 1.6 oz (59.9 kg)   SpO2 95%   BMI 22.67 kg/m²     General appearance: No apparent distress, appears stated age and cooperative. HEENT: Pupils equal, round, and reactive to light. Conjunctivae/corneas clear. Neck: Supple, with full range of motion. No jugular venous distention. Trachea midline.   Respiratory:  Normal respiratory effort. Clear to auscultation, bilaterally without Rales/Wheezes/Rhonchi. Cardiovascular: Regular rate and rhythm with normal S1/S2 without murmurs, rubs or gallops. Abdomen: Soft, non-tender, non-distended with normal bowel sounds. Musculoskeletal: No clubbing, cyanosis or edema bilaterally. Full range of motion without deformity. Skin: Skin color, texture, turgor normal.  No rashes or lesions. Neurologic:  Neurovascularly intact without any focal sensory/motor deficits. Cranial nerves: II-XII intact, grossly non-focal.  Psychiatric: Alert and oriented, thought content appropriate, normal insight  Capillary Refill: Brisk,< 3 seconds   Peripheral Pulses: +2 palpable, equal bilaterally       Labs:   Recent Labs      01/14/18   0732   WBC  9.9   HGB  9.9*   HCT  29.9*   PLT  425*     No results for input(s): NA, K, CL, CO2, BUN, CREATININE, CALCIUM, PHOS in the last 72 hours. Invalid input(s): MAGNES  No results for input(s): AST, ALT, BILIDIR, BILITOT, ALKPHOS in the last 72 hours. No results for input(s): INR in the last 72 hours. No results for input(s): Laly Fine in the last 72 hours. Urinalysis:    Lab Results   Component Value Date    NITRU NEGATIVE 01/06/2018    WBCUA 0-2 01/06/2018    BACTERIA NONE 01/06/2018    RBCUA 5-10 01/06/2018    BLOODU NEGATIVE 01/06/2018    SPECGRAV 1.010 01/06/2018    GLUCOSEU NEGATIVE 12/17/2016       Radiology:  XR CHEST PORTABLE   Final Result   1. There is no acute cardiopulmonary process. **This report has been created using voice recognition software. It may contain minor errors which are inherent in voice recognition technology. **      Final report electronically signed by Dr. Guo Dates on 1/10/2018 7:25 AM      XR HIP LEFT (2-3 VIEWS)   Final Result   Status post left hip arthroplasty. **This report has been created using voice recognition software.  It may contain minor errors which are inherent in voice recognition fractures of the thoracic vertebrae. 2.  Stable-appearing T12 compression deformity since the CT abdomen and pelvis of December 17, 2016. **This report has been created using voice recognition software. It may contain minor errors which are inherent in voice recognition technology. **      Final report electronically signed by Dr. Abdulkadir Bliss on 1/6/2018 2:06 AM      CT CERVICAL SPINE WO CONTRAST   Final Result   1. No acute fractures or subluxations. 2.  Degenerative changes as described which appear unchanged from prior study of November 27, 2017. **This report has been created using voice recognition software. It may contain minor errors which are inherent in voice recognition technology. **      Final report electronically signed by Dr. Abdulkadir Bliss on 1/6/2018 1:55 AM      CT HEAD WO CONTRAST   Final Result   1. No acute intracranial hemorrhage, infarction or mass. 2.  Mild mucosal thickening in the ethmoid air cells. 3.  No fractures. **This report has been created using voice recognition software. It may contain minor errors which are inherent in voice recognition technology. **      Final report electronically signed by Dr. Abdulkadir Bliss on 1/6/2018 1:50 AM          Diet: DIET GENERAL;  Dietary Nutrition Supplements: Standard High Calorie Oral Supplement    DVT prophylaxis: [] Lovenox                                 [] SCDs                                 [] SQ Heparin                                 [] Encourage ambulation           [] Already on Anticoagulation     Disposition:    [] Home       [] TCU       [] Rehab       [] Psych       [] SNF       [] [facility]       [] Other-    Code Status: DNR-CCA    PT/OT Eval Status: yes    Assessment/Plan:  No change, fx set  No change in chronic dementia  Anticipated Discharge in : am    Active Hospital Problems    Diagnosis Date Noted    Subcapital fracture of femur, left, closed, initial encounter [facility] Trevon Li      Priority: High    Mixed simple [facility]opurulent [facility] [J41.8]     Closed left hip fracture ([facility] [S72.002A] 01/06/2018    Fall [P98. XXXA] 01/06/2018    HTN (hypertension) [I10] 01/06/2018    Pre-op evaluation [Z01.818]     Essential hypertension [I10]     Dementia with behavioral disturbance [F03.91] 11/30/2016    COPD (chronic obstructive pulmonary disease) ([facility] [J44.9] 01/19/2016    History of MRSA infection [Z86.14] 01/18/2012    Mitral regurgitation [I34.0] 01/18/2012    Medtronic dual pacer [Z95.0]        1. stable        Electronically signed by Jaymie Erickson DO on 1/14/2018 at 4:55 PM

## 2018-01-14 NOTE — PROGRESS NOTES
[facility]  PHYSICAL THERAPY MISSED TREATMENT NOTE  ACUTE CARE    Date: 2018  Patient Name: Arturo Turner        MRN: [id number]   : 1933  (80 y.o.)  Gender: female   Referring Practitioner: Jacqueline Barlow MD  Referring Practitioner: Nehemias Byers MD  Diagnosis: Fracture of Unspecified Femur. Diagnosis: Oth fracture of unsp femur, init encntr for closed fracture         REASON FOR MISSED TREATMENT:  Missed Treat.

## 2018-01-14 NOTE — PLAN OF CARE
to patient this shift. Negative Dae's sign bialterally. No redness, swelling, or warmth noted to bilateral calves. Patient sometimes compliant with wearing SCDs while in bed. Patient pulls at SCDs when in place and tries to remove them. Patient compliant with taking xarelto as ordered. Will continue to monitor. Problem: Respiratory  Goal: O2 Sat > 90%  Outcome: Met This Shift  Patient on room air this shift with oxygen above 92%. Problem: GI  Goal: No bowel complications  Outcome: Met This Shift  Patient's last BM was yesterday, 1/12. Patient has active bowel sounds. Patient compliant with taking stool softener as ordered. Will monitor. Problem:   Goal: Adequate urinary output  Outcome: Met This Shift  Patient voiding adequate amounts of clear, yellow urine after dhillon catheter removal.     Problem: Nutrition  Goal: Optimal nutrition therapy  Outcome: Met This Shift  Patient on general diet and tolerating well. No nausea/vomiting noted to patient this shift. Problem: Musculor/Skeletal Functional Status  Goal: Highest potential functional level  Outcome: Ongoing  Patient up with traci pereira and sharon assist and tolerating well. Patient working with PT/OT. Will continue to work with PT/OT. Problem: Discharge Planning:  Goal: Discharged to appropriate level of care  Discharged to appropriate level of care   Outcome: Ongoing  Patient and family planning for patient to go to [facility] at discharge.  assisting patient and family with discharge needs. Problem: DISCHARGE BARRIERS  Goal: Patient's continuum of care needs are met  Outcome: Ongoing  Planning ECF at discharge. Comments: Care plan reviewed with patient and family. Patient and family verbalize understanding of the plan of care and contribute to goal setting.

## 2018-01-15 NOTE — CARE COORDINATION
1/15/18, 1:54 PM    DISCHARGE BARRIERS    Spoke with daughter in law, Rosita Lion. Family is in agreement with discharge today to [facility]. They plan to continue to try to work out an agreement with [employer]. [facility] is able to accept. Spoke with  Angeli Rucker at [facility] and confirmed plan. Transfer packet on chart for discharge. 1/15/18, 3:18 PM    Discharge plan discussed by  and . Discharge plan reviewed with patient/ family. Patient/ family verbalize understanding of discharge plan and are in agreement with plan. Understanding was demonstrated using the teach back method.    Services After Discharge  Services At/After Discharge: Skilled Therapy   IMM Letter  IMM Letter given to Patient/Family/Significant other/Guardian/POA/by[de-identified]   IMM Letter date given[de-identified] 01/12/18  IMM Letter time given[de-identified] 1400

## 2018-01-15 NOTE — PROGRESS NOTES
Report called to Ian Ramírez at [facility] at this time. All questions answered. All orders, scripts, information faxed to [facility] at this time. Transport request faxed to [facility] for stretcher transport to [facility]. Family member Saima Rusty called and updated on discharge status at this time. All belongings packed and ready for discharge. Will continue to monitor.

## 2018-01-15 NOTE — DISCHARGE SUMMARY
98.1 °F (36.7 °C) 97.7 °F (36.5 °C) 98.4 °F (36.9 °C) 97.7 °F (36.5 °C)   TempSrc: Oral Oral Oral Oral   SpO2: 95% 97% 95% 94%   Weight:       Height:         Weight: Weight: 132 lb 1.6 oz (59.9 kg)     24 hour intake/output:  Intake/Output Summary (Last 24 hours) at 01/15/18 1538  Last data filed at 01/15/18 1455   Gross per 24 hour   Intake              750 ml   Output               50 ml   Net              700 ml         General appearance:  No apparent distress, appears stated age and cooperative. HEENT:  Normal cephalic, atraumatic without obvious deformity. Pupils equal, round, and reactive to light. Extra ocular muscles intact. Conjunctivae/corneas clear. Neck: Supple, with full range of motion. No jugular venous distention. Trachea midline. Respiratory:  Normal respiratory effort. Clear to auscultation, bilaterally without Rales/Wheezes/Rhonchi. Cardiovascular:  Regular rate and rhythm with normal S1/S2 without murmurs, rubs or gallops. Abdomen: Soft, non-tender, non-distended with normal bowel sounds. Musculoskeletal:;eftSkin: Skin color, texture, turgor normal.  No rashes or lesions. left hip fx repaired, dementia severe  Dehydration   Neurologic:  Neurovascularly intact without any focal sensory/motor deficits. Cranial nerves: II-XII intact, grossly non-focal.  Psychiatric:  Alert and oriented, thought content appropriate, normal insight  Capillary Refill: Brisk,< 3 seconds   Peripheral Pulses: +2 palpable, equal bilaterally       Labs:  For convenience and continuity at follow-up the following most recent labs are provided:      CBC:    Lab Results   Component Value Date    WBC 9.9 01/14/2018    HGB 9.9 01/14/2018    HCT 29.9 01/14/2018     01/14/2018       Renal:  Lab Results   Component Value Date     01/09/2018    K 4.2 01/09/2018     01/09/2018    CO2 25 01/09/2018    BUN 11 01/09/2018    CREATININE 0.4 01/09/2018    CALCIUM 8.2 01/09/2018         Significant Diagnostic REHAB/TCU ADMISSION COORDINATOR    Disposition:    [] Home       [] TCU       [] Rehab       [] Psych       [] SNF       [] [place]       [] Other-    Condition at Discharge: Stable    Code Status:  DNR-CCA      Patient Instructions:    Discharge lab work: Activity: bedrest  Diet: DIET GENERAL;  Dietary Nutrition Supplements: Standard High Calorie Oral Supplement      Follow-up visits:   [facility]  [phone]    Go today      Rachel Backers, [address]  [address] [facility] [address]  [phone]    On 3/14/2018  at 9:00 am         Discharge Medications:      Lacey Domínguez   Home Medication Instructions UWO:[id number]    Printed on:01/15/18 5642   Medication Information                      albuterol (PROVENTIL HFA;VENTOLIN HFA) 108 (90 BASE) MCG/ACT inhaler  Inhale 2 puffs into the lungs every 4 hours as needed for Wheezing             amLODIPine (NORVASC) 2.5 MG tablet  Take 1 tablet by mouth daily             aspirin 81 MG tablet  Take 81 mg by mouth daily             calcium carbonate (TUMS) 500 MG chewable tablet  Take 2 tablets by mouth daily             hydrochlorothiazide (HYDRODIURIL) 12.5 MG tablet  Take 1 tablet by mouth daily             HYDROcodone-acetaminophen (NORCO) 5-325 MG per tablet  Take 1-2 tablets by mouth every 4 hours as needed for Pain for up to 10 days. rivaroxaban (XARELTO) 10 MG TABS tablet  Take 1 tablet by mouth every 24 hours             senna (SENOKOT) 8.6 MG tablet  Take 1 tablet by mouth nightly             verapamil (CALAN SR) 180 MG extended release tablet  Take 1 tablet by mouth nightly                 Time Spent on discharge is more than 30 minutes in the examination, evaluation, counseling and review of medications and discharge plan. Discharge Medication    Signed: Thank you Susan Land DO for the opportunity to be involved in this patient's care.     Electronically signed by Tavia Fleming DO on

## 2018-02-22 PROBLEM — J96.00 ACUTE RESPIRATORY FAILURE (HCC): Status: ACTIVE | Noted: 2018-01-01

## 2018-02-22 NOTE — PROGRESS NOTES
Pharmacy Note  Vancomycin Consult    Nico Tucker is a 80 y.o. female started on Vancomycin for left lower lobe consolidation suggesting possible empyema or abscess. ; consult received from Dr. Marty Noyola to manage therapy. Also receiving the following antibiotics: Levaquin, Zosyn. Patient Active Problem List   Diagnosis    Medtronic dual pacer    SSS (sick sinus syndrome) (HCC)    Mitral regurgitation    Lower urinary tract infectious disease    History of MRSA infection    Chest pain    SOB (shortness of breath)    [facility]    Dementia with behavioral disturbance    Oth fracture of unsp femur, init encntr for closed fracture ([facility]    Closed left hip fracture ([address]    Fall    HTN (hypertension)    Subcapital fracture of femur, left, closed, initial encounter ([facility]    Pre-op evaluation    Essential hypertension    Mixed simple and mucopurulent chronic bronchitis ([address]    Acute respiratory failure ([address]    Empyema lung ([facility]    Pneumonia due to infectious organism       Allergies:  Bystolic [nebivolol hcl] and Lisinopril     Temp max: 100.3    Recent Labs      02/22/18   1421   BUN  25*       Recent Labs      02/22/18   1421   CREATININE  0.5       Recent Labs      02/22/18   1421   WBC  10.8       No intake or output data in the 24 hours ending 02/22/18 1851    PCT = 0.26    Ht Readings from Last 1 Encounters:   02/22/18 5' 4\" (1.626 m)        Wt Readings from Last 1 Encounters:   02/22/18 139 lb 4.8 oz (63.2 kg)         Body mass index is 23.91 kg/m². Estimated Creatinine Clearance: 72 mL/min (based on SCr of 0.5 mg/dL). Est CrCl = 45 ml/minute using IBW = 55 kg and sCr rounded to 0.8 due to age     Assessment/Plan:  Will initiate vancomycin 1000 mg IV every 24 hours with next dose 02/23/18 1700. Pt received Vancomycin 1000 mg 2/22/2018 at 1650 in ED.   Timing of trough level will be determined based on culture results, renal function, and

## 2018-02-22 NOTE — ED NOTES
Transported to [facility]. Spoke to [employer]      Nehemias Sensor, SANTOS  75[birth year] [id number]

## 2018-02-22 NOTE — ED PROVIDER NOTES
alert.   Nursing note and vitals reviewed. DIFFERENTIAL DIAGNOSIS:   metabolic encephalopathy  Pneumonia  UTI    DIAGNOSTIC RESULTS     EKG: All EKG's are interpreted by the Emergency Department Physician who either signs or Co-signs this chart in the absence of a cardiologist.  Normal sinus rhythm, normal intervals    RADIOLOGY: non-plain film images(s) such as CT, Ultrasound and MRI are read by the radiologist.  Plain radiographic images are visualized and preliminarily interpreted by the emergency physician unless otherwise stated below. XR CHEST PORTABLE   Final Result   Heterogeneous left lower lobe consolidation suggesting possible empyema or abscess. **This report has been created using voice recognition software. It may contain minor errors which are inherent in voice recognition technology. **      Final report electronically signed by Dr. Dane Herrera on 2/22/2018 2:44 PM          LABS:   Labs Reviewed   CBC WITH AUTO DIFFERENTIAL - Abnormal; Notable for the following:        Result Value    RBC 3.68 (*)     Hemoglobin 10.4 (*)     Hematocrit 32.9 (*)     MCHC 31.6 (*)     RDW 15.0 (*)     Segs Absolute 8.8 (*)     Lymphocytes # 0.6 (*)     All other components within normal limits   COMPREHENSIVE METABOLIC PANEL - Abnormal; Notable for the following:     Glucose 153 (*)     BUN 25 (*)     Chloride 94 (*)     Alkaline Phosphatase 164 (*)     Total Bilirubin 0.2 (*)     All other components within normal limits   BLOOD GAS, ARTERIAL - Abnormal; Notable for the following:     pH, Blood Gas 7.22 (*)     PCO2 85 (*)     HCO3 35 (*)     Base Excess (Calculated) 4.8 (*)     All other components within normal limits   PROCALCITONIN - Abnormal; Notable for the following:     Procalcitonin 0.26 (*)     All other components within normal limits   BLOOD GAS, ARTERIAL - Abnormal; Notable for the following:     pH, Blood Gas 7.24 (*)     PCO2 81 (*)     PO2 60 (*)     HCO3 35 (*)     Base Excess (Calculated) 5.4 (*)     All other components within normal limits   MICROSCOPIC URINALYSIS - Abnormal; Notable for the following:     Blood, Urine SMALL (*)     Protein,  (*)     Leukocytes, UA SMALL (*)     Character, Urine TURBID (*)     All other components within normal limits   BLOOD GAS, ARTERIAL - Abnormal; Notable for the following:     pH, Blood Gas 7.25 (*)     PCO2 78 (*)     PO2 70 (*)     HCO3 35 (*)     Base Excess (Calculated) 5.4 (*)     All other components within normal limits   RAPID INFLUENZA A/B ANTIGENS   CULTURE BLOOD #1   CULTURE BLOOD #2   URINE CULTURE   APTT   PROTIME-INR   LIPASE   LACTATE, SEPSIS   LACTATE, SEPSIS   TSH WITH REFLEX   ANION GAP   GLOMERULAR FILTRATION RATE, ESTIMATED   OSMOLALITY       EMERGENCY DEPARTMENT COURSE:   Vitals:    Vitals:    02/22/18 1355   BP: (!) 153/68   Pulse: 99   Resp: (!) 33   Temp: 98.6 °F (37 °C)   TempSrc: Axillary   SpO2: 80     80year-old female with cough hypoxia and altered mental status , see HPI above. Vital signs and physical exam as noted above. Breathing treatment and ABG ordered  Sepsis workup initiated   Chest x-ray shows empyema, patient started on IV antibiotics  Patient is DNR, family states no intubation, was supplemented with nasal cannula, ABG showing acidosis with hypercapnia, upgraded to BiPAP, family still refusing intubation, repeat ABG remains unchanged, further conversations with more family members with explanation that the cause is reversible, family still declining intubation. Hospitalist Dr Km Wasserman, contacted requesting consult for Pulmonologist Dr Dia Gonzalez. Family still refusing intubation, admitted to stepdown. CRITICAL CARE:   Due to the immediate potential for life-threatening deterioration due to respiratory failure, I spent 35 minutes providing critical care. This time is excluding time spent performing procedures. CONSULTS:  None    PROCEDURES:  None    FINAL IMPRESSION      1. Empyema lung ([address]    2. Acute respiratory failure with hypoxia and hypercapnia (HCC)          DISPOSITION/PLAN     1. Empyema lung ([address]    2. Acute respiratory failure with hypoxia and hypercapnia ([address]        PATIENT REFERRED TO:  Adonis Joseph, DO  [address]  [place] [address] [address]  [phone]            DISCHARGE MEDICATIONS:  Current Discharge Medication List          (Please note that portions of this note were completed with a voice recognition program.  Efforts were made to edit the dictations but occasionally words are mis-transcribed.)      Scribe: Juvenal Crump 2/22/18 2:14 PM Scribing for and in the presence of Allyne Bloch, MD      Signed by:Anu Ureña, 02/22/18 2:21 PM    Provider:  I personally performed the services described in the documentation, reviewed and edited the documentation which was dictated to the scribe in my presence, and it accurately records my words and actions.     Allyne Bloch, MD 2/22/18 2:21 PM        Lorrie Atwood DO  02/22/18 0731

## 2018-02-22 NOTE — CONSULTS
[facility]    Patient - Garlnad Saldivar   MRN -  [id number]   Acct # - [de-identified]   - 1933      Date of Admission -  2018  1:42 PM  Date of evaluation -  2018  Room - [facility]   Hospital Day - [address], * Primary Care Physician - Yoon Lynn,    Chief Complaint   Acute respiratory failure  Active Hospital Problem List      Active Hospital Problems    Diagnosis Date Noted    Acute respiratory failure [facility]) [J96.00] 2018     HPI   Garland Saldivar is a 80 y.o. female present to ED via Ambulance from [address] [place] [address] Road home, with confusion, rapid respiratory rate, recent L hip fracture on rehab, has had UTI. Hypoxemic, placed on NC and initial ABGs 7.22/85/79/92%, placed on BiPAP 20/8 repeated ABGs 7.24/81/60/84%  Given Ativan. I saw Hannah Carranza in the ED Trauma #1 unresponsive not synchronizing with Bipap, Dr Sasha Estrada presents talking to the family, POA present,general agreement is to continue current approach without aggressive intervention ie: no intubation, some family having difficulty with that approach. Past Medical History         Diagnosis Date    Alzheimer's dementia     Fracture     right hummerus fracture     History of MRSA infection UTI    Hypertension     Migraine     Mild mitral regurgitation by prior echocardiogram     Pacemaker 09    Medtronic dual pacer    [facility] syndrome) [facility]     Syncope       Past Surgical History           Procedure Laterality Date    BLADDER SUSPENSION      HYSTERECTOMY      VA PARTIAL HIP REPLACEMENT Left 2018    LEFT HIP HEMIARTHROPLASTY performed by Za Spencer MD at [facility] [nebivolol hcl] and Lisinopril  Social History     Social History     Social History    Marital status:       Spouse name: N/A    Number of children: N/A    Years of education: N/A     Occupational History    Radiology    CXR  2/22/18        Impression   Heterogeneous left lower lobe consolidation suggesting possible empyema or abscess.               **This report has been created using voice recognition software.  It may contain minor errors which are inherent in voice recognition technology. **       Final report electronically signed by Dr. Carmen Rodriguez on 2/22/2018 2:44 PM       CT Scans    (See actual reports for details)    Assessment   Acute respiratory failure  HCAP---h/o MRSA infection  IVVD  Metabolic encephalopathy  PPM  Recommendations   If intubation is an option, the patient needs to go to ICU and desirable to intubate sooner than later, this was discussed with POA and other family members, Dr Allan Villasenor agrees  DNR-CCA  PAP support, PAP settings adjusted  Broad spectrum antibiotics  Volume replacement  Morphine prn  Not a candidate for any procedures at this time besides above  Palliative care consult    Thank you for the consult and allowing us to participate in the care of your patient. Case discussed with nurse and patient/family. Questions and concerns addressed. Meds and Orders reviewed.     Electronically signed by     Noel Camacho MD on 2/22/2018 at 5:56 PM

## 2018-02-22 NOTE — H&P
tablet by mouth nightly 30 tablet 3    aspirin 81 MG tablet Take 81 mg by mouth daily      calcium carbonate (TUMS) 500 MG chewable tablet Take 2 tablets by mouth daily      albuterol (PROVENTIL HFA;VENTOLIN HFA) 108 (90 BASE) MCG/ACT inhaler Inhale 2 puffs into the lungs every 4 hours as needed for Wheezing 3 Inhaler 3       Allergies:  Bystolic [nebivolol hcl] and Lisinopril    Social History:    reports that she quit smoking about 30 years ago. Her smoking use included Cigarettes. She quit after 5.00 years of use. She has never used smokeless tobacco. She reports that she does not drink alcohol or use drugs. Family History:   History reviewed. No pertinent family history. Review of systems:  As mentioned above per family, otherwise patient is non verbal.       Vitals:   Vitals:    02/22/18 1742   BP: (!) 144/71   Pulse: 97   Resp: 20   Temp:    SpO2: 98%      BMI: Body mass index is 22.66 kg/m². Exam:  Physical Examination: General appearance - asleep, respiratory distress on bipap  HEENT: Atraumatic normocephalic,PERRLA  Neck - supple, no significant adenopathy, no JVD, or carotid bruits  Chest - diminished breath sounds  Heart - S1S2 RRR, no murmurs or gallops  Abdomen - Soft, non tender non distended. Normoactive bowel sounds  Extremities - bilateral pedal edema, no cyanosis or clubbing  Skin - normal coloration and turgor, no rashes, no suspicious skin lesions noted      Review of Labs and Diagnostic Testing:  CBC:   Recent Labs      02/22/18   1421   WBC  10.8   HGB  10.4*   PLT  327     BMP:    Recent Labs      02/22/18   1421   NA  140   K  4.3   CL  94*   CO2  32   BUN  25*   CREATININE  0.5   GLUCOSE  153*     Calcium:  Recent Labs      02/22/18   1421   CALCIUM  9.7     Ionized Calcium:No results for input(s): IONCA in the last 72 hours. Magnesium:No results for input(s): MG in the last 72 hours. Phosphorus:No results for input(s): PHOS in the last 72 hours.   BNP:No results for input(s): BNP in the last 72 hours. Glucose:No results for input(s): POCGLU in the last 72 hours. HgbA1C: No results for input(s): LABA1C in the last 72 hours. INR:   Recent Labs      02/22/18   1421   INR  1.03     Hepatic:   Recent Labs      02/22/18   1421   ALKPHOS  164*   ALT  41   AST  38   PROT  7.7   BILITOT  0.2*   LABALBU  3.7     Amylase and Lipase:No results for input(s): LACTA, AMYLASE in the last 72 hours. Lactic Acid: No results for input(s): LACTA in the last 72 hours. Troponin: No results for input(s): CKTOTAL, CKMB, TROPONINT in the last 72 hours. BNP: No results for input(s): BNP in the last 72 hours. Lipids: No results for input(s): CHOL, TRIG, HDL, LDLCALC in the last 72 hours. Invalid input(s): LDL  ABGs:   Lab Results   Component Value Date    PH 7.24 02/22/2018    PCO2 81 02/22/2018    PO2 60 02/22/2018    HCO3 35 02/22/2018    O2SAT 84 02/22/2018       Radiology:     Xr Chest Portable    Result Date: 2/22/2018  PROCEDURE: XR CHEST PORTABLE CLINICAL INFORMATION: tachypnea,  . COMPARISON: 1/9/2018 TECHNIQUE: Portable semiupright FINDINGS: Patient is rotated. Heart size is within normal limits. There is left basilar consolidation with central lucency suggesting possible abscess or empyema. Pulmonary vessels are not congested. Deformity of the right humeral head and neck from remote fracture. AICD and EKG leads overlie the chest.     Heterogeneous left lower lobe consolidation suggesting possible empyema or abscess. **This report has been created using voice recognition software. It may contain minor errors which are inherent in voice recognition technology. ** Final report electronically signed by Dr. Chen Hancock on 2/22/2018 2:44 PM       Assessment:    Active Problems:    Acute respiratory failure ([address]  Resolved Problems:    * No resolved hospital problems. *      Plan:  1. Acute hypoxemic respiratory failure:  Continue with bipap. Monitor ABG. PRN morphine.   2. Pneumonia:

## 2018-02-23 PROBLEM — J96.01 ACUTE RESPIRATORY FAILURE WITH HYPOXIA AND HYPERCAPNIA (HCC): Status: ACTIVE | Noted: 2018-01-01

## 2018-02-23 PROBLEM — J96.02 ACUTE RESPIRATORY FAILURE WITH HYPOXIA AND HYPERCAPNIA (HCC): Status: ACTIVE | Noted: 2018-01-01

## 2018-02-23 NOTE — CARE COORDINATION
patient want to participate in local refill/ meds to beds program?  No  Type of Home Care Services:  None  Patient expects to be discharged to:  [facility]  Expected Discharge date:  02/26/18  Follow Up Appointment: Best Day/ Time: Wednesday PM    Discharge Plan: plans return [facility] ECF (precert)     Evaluation: yes

## 2018-02-23 NOTE — PROGRESS NOTES
DISCHARGE BARRIERS  2/23/18, 10:08 AM    Reason for Referral: From [facility]  Mental Status: Patient is alert however confused due to Dementia. Decision Making: Son and daughter-in-law or POAs make decisions for patient  Family/Social/Home Environment: Patient is admitted from [facility], patient was just discharged to [facility] on 1/15/18 for rehab. Patient's son and daughter-in-law caring for her in their home prior to last admission. Current Services: ECF  Current Equipment: 4-wheeled walker  Payment Source: [employer]  Concerns or Barriers to Discharge: None; patient to return to [facility] List of ECF/HH were provided: N/A    Teach Back Method used with patient's son/family regarding care plan and discharge planning   Patient and son verbalize understanding of the plan of care and contribute to goal setting. Anticipated Needs/Discharge Plan: SW met with patient and patient's son, patient sleeping at time of visit. Patient son states plan is to return to [facility]. Pre-cert to return.      Electronically signed by NANNETTE Ledesma on 2/23/2018 at 10:08 AM

## 2018-02-23 NOTE — PROGRESS NOTES
[facility]    Patient - Sasha Arrington   MRN -  [id number]   Acct # - [de-identified]   - 1933      Date of Admission -  2018  1:42 PM  Date of evaluation -  2018  Room - [address] [place] [address] Coleman Llanes MD Primary Care Physician - Alejandro Martinez DO   Chief Complaint   Acute respiratory failure  Active Hospital Problem List      Active Hospital Problems    Diagnosis Date Noted    Acute respiratory failure [facility]) [J96.00] 2018    Empyema lung ([address] [J86.9]     Pneumonia due to infectious organism [J18.9]      HPI   Sasha Arrington is a 80 y.o. female present to ED via Ambulance from [address] home, with confusion, rapid respiratory rate, recent L hip fracture on rehab, has had UTI. Hypoxemic, placed on NC and initial ABGs 7.22/85/79/92%, placed on BiPAP 20/8 repeated ABGs 7.24/81/60/84%  Given Ativan. I saw Gerson Montalvo in the ED Trauma #1 unresponsive not synchronizing with Bipap, Dr Coleman Llanes presents talking to the family, POA present,general agreement is to continue current approach without aggressive intervention ie: no intubation, some family having difficulty with that approach.     Past 24 hours   -Tmax 101  -On BiPAP FiO2 weaned to 40%  -AMS  -Follows simple commands  -Non communicative  -Repeat ABG shows small improvement in PCO2  All other systems reviewed  Meds    Current Medications    enoxaparin  1 mg/kg Subcutaneous BID    sodium chloride flush  10 mL Intravenous 2 times per day    ipratropium-albuterol  1 ampule Inhalation Q4H WA    piperacillin-tazobactam  3.375 g Intravenous Q8H    levofloxacin  500 mg Intravenous Q24H    metoprolol  5 mg Intravenous Q6H    vancomycin (VANCOCIN) intermittent dosing (placeholder)   Other RX Placeholder    vancomycin  1,000 mg Intravenous Q24H     acetaminophen, morphine, sodium chloride flush, acetaminophen, magnesium hydroxide, ondansetron  IV Drips/Infusions   dextrose 5 % and 0.45 % NaCl       Vitals    Vitals    height is 5' 4\" (1.626 m) and weight is 141 lb 4.8 oz (64.1 kg). Her rectal temperature is 100.8 °F (38.2 °C). Her blood pressure is 130/83 and her pulse is 114. Her respiration is 27 and oxygen saturation is 97%. O2 Flow Rate (L/min): 3 L/min  I/O    Intake/Output Summary (Last 24 hours) at 02/23/18 1458  Last data filed at 02/23/18 1342   Gross per 24 hour   Intake          2114.44 ml   Output              725 ml   Net          1389.44 ml     Patient Vitals for the past 96 hrs (Last 3 readings):   Weight   02/23/18 0311 141 lb 4.8 oz (64.1 kg)   02/22/18 1812 139 lb 4.8 oz (63.2 kg)   02/22/18 1520 132 lb (59.9 kg)     Exam   Physical Exam   Constitutional: No distress on BiPAP. Patient moderately nourished  Head: Normocephalic and atraumatic. Mouth/Throat: Oropharynx is clear and moist.  No oral thrush. Eyes: Conjunctivae are normal. Pupils are equal, round. No scleral icterus. Neck: Neck supple. No tracheal deviation present. Cardiovascular: Regular rate, regular rhythm, S1 and S2 with no murmur. No peripheral edema  Pulmonary/Chest: Bilateral air entry diminished breath sounds throughout. No stridor. No respiratory distress. Patient exhibits no tenderness. Abdominal: Soft. Bowel sounds audible. No distension or tenderness to palp. Musculoskeletal: Moves all extremities  Neurological: Patient responds to name, follows simple commands non-communicative. Skin: Warm and dry.    Labs   ABG  Lab Results   Component Value Date    PH 7.31 02/23/2018    PO2 96 02/23/2018    PCO2 64 02/23/2018    HCO3 32 02/23/2018    O2SAT 97 02/23/2018     Lab Results   Component Value Date    IFIO2 40 02/23/2018    MODE CPAP/PS 02/23/2018    SETPEEP 8.0 02/23/2018     CBC  Recent Labs      02/22/18   1421  02/23/18   0425   WBC  10.8  5.9   RBC  3.68*  3.11*   HGB  10.4*  9.0*   HCT  32.9*  27.9*   MCV  89.5  89.8   MCH  28.3  29.1   MCHC  31.6* 32.4*   RDW  15.0*  15.0*   PLT  327  234   MPV  8.8  8.8      BMP  Recent Labs      02/22/18   1421  02/23/18   0425   NA  140  140   K  4.3  4.5   CL  94*  103   CO2  32  30   BUN  25*  21   CREATININE  0.5  0.5   GLUCOSE  153*  102   CALCIUM  9.7  8.4*     LFT  Recent Labs      02/22/18   1421   AST  38   ALT  41   BILITOT  0.2*   ALKPHOS  164*   LIPASE  15.9     TROP  Lab Results   Component Value Date    TROPONINT < 0.010 12/17/2016    TROPONINT < 0.010 06/02/2015    TROPONINT < 0.010 06/01/2015     BNP  Lab Results   Component Value Date    PROBNP 126.9 06/01/2015     D-Dimer  No results found for: DDIMER  Lactic Acid  No results for input(s): LACTA in the last 72 hours. INR  Recent Labs      02/22/18   1421   INR  1.03     PTT  Recent Labs      02/22/18   1421   APTT  25.6     Glucose  No results for input(s): POCGLU in the last 72 hours. UA   Recent Labs      02/22/18   1656   SPECGRAV  1.025   PHUR  5.5   COLORU  YELLOW   MUCUS  THREADS   PROTEINU  100*   BLOODU  SMALL*   RBCUA  3-5   WBCUA  > 100   BACTERIA  MANY   NITRU  NEGATIVE   BILIRUBINUR  NEGATIVE   UROBILINOGEN  0.2   KETUA  NEGATIVE   LABCAST  >15 HYALINE  0-4 WBC   . PFTs   N/A  Echo     2015  PROCEDURE: The procedure was performed in the echo lab. This was a routine study. The transthoracic approach was used. The study included complete 2D imaging, M-mode, complete spectral Doppler, and color Doppler. Systolic blood pressure was 112 mmHg, at the start of the study. Diastolic blood pressure was 62 mmHg, at the start of  the study. Image quality was adequate.     LEFT VENTRICLE: Size was normal. Systolic function was normal. Ejection fraction was estimated in the range of 55 % to  65 %. There were no regional wall motion abnormalities.  Wall thickness was normal. DOPPLER: Left ventricular diastolic  function parameters were normal.  Cultures    Procalcitonin  Lab Results   Component Value Date    PROCAL 0.26 02/22/2018     Rapid Flu

## 2018-02-23 NOTE — PROGRESS NOTES
Pharmacy Medication History Note      List of current medications patient is taking is complete. Source of information: [facility] MAR    Changes made to medication list:  Medications removed (include reason, ex. therapy complete or physician discontinued): · Ventolin Inhaler (not on MAR)    Medications added/doses adjusted:  · Adjusted Tums (2 tabs at HS)  · Adjusted Lorazepam 0.5mg (1 tab AM 2 tab HS) currently on hold as of 2/22/18 but still listed on MAR. · Adjusted Senna schedule(evening)  · Adjusted verapamil schedule (evening)  · Added Albuterol Nebulizer 0.083% 1 vial q 4 hour prn SOB/Wheezing    Other notes (ex. Recent course of antibiotics, Coumadin dosing):    · Denies use of other OTC or herbal medications.       Allergies reviewed      Electronically signed by Britney Montes on 2/23/2018 at 10:13 AM

## 2018-02-23 NOTE — PROGRESS NOTES
Patient lying in bed HOB elevated. Family at bedside. PERRL. Patient responsive to stimuli only. Bipap in place. Skin turgor <3. Dry tan skin with scattered ecchymosis noted bilateral forearms. IV in left antecubital is patent and fusing. Dressing is dry clean and intact. Small scabbed area noted to top of right lower shin. Lung sounds are diminished. Treadwell Catheter in place patent and draining yellow clear urine. Non pitting edema noted to bilateral lower legs. Bowel sounds active x 4. Abdomen is soft, round and non tender. Coccyx red, blanchable and dry no open areas noted. Facial grimacing and moaning noted with turning and repositioning.

## 2018-02-23 NOTE — PROGRESS NOTES
Progress Note    Patient:  Woody Stewart    Unit/Bed:4K-14/014-A  YOB: 1933  MRN: [id number]   Acct: [de-identified]   Admit date: 2/22/2018      Active Problems:    Acute respiratory failure ([facility]    Empyema lung ([facility]    Pneumonia due to infectious organism  Resolved Problems:    * No resolved hospital problems. *        Assessment and Plan:  1. Acute hypoxemic respiratory failure with hypercarbia:  ABG slowly improving  Continue with bipap. Monitor ABG. PRN morphine. Appreciate pulm input  2. Pneumonia: CXR suggests empyema/abscess. Continue with IV antibiotics. monitor cultures. CT chest to eval further for abscess / empyema    3. HTN: Lopressor 5mg IV Q6  4. Atrial fibrillation: rate controlled, increase Lovenox to 1mg/kg Q 12  5. VTE prophylaxis: Lovenox  6. Dispo: discussed with family with Dr Dia Gonzalez in the ED. Agreeable to Limited code X 4. Patient Seen, Chart, Consults notes, Labs, Radiology studies reviewed. Subjective: Day 1 of stay with acute hypoxemic respiratory failure  Patient seen and examined at bedside, more alert today. Family present at bedside. Oxygenation improving. All other ROS negative except noted in HPI    Past, Family, Social History unchanged from admission.     Diet:  Diet NPO Effective Now    Medications:  Scheduled Meds:   enoxaparin  1 mg/kg Subcutaneous BID    sodium chloride flush  10 mL Intravenous 2 times per day    ipratropium-albuterol  1 ampule Inhalation Q4H WA    piperacillin-tazobactam  3.375 g Intravenous Q8H    levofloxacin  500 mg Intravenous Q24H    metoprolol  5 mg Intravenous Q6H    vancomycin (VANCOCIN) intermittent dosing (placeholder)   Other RX Placeholder    vancomycin  1,000 mg Intravenous Q24H     Continuous Infusions:   sodium chloride 150 mL/hr at 02/22/18 1949     PRN Meds:acetaminophen, sodium chloride flush, acetaminophen, magnesium hydroxide, ondansetron, morphine    Objective:  CBC:   Recent Labs

## 2018-02-24 PROBLEM — J96.00 ACUTE RESPIRATORY FAILURE (HCC): Status: ACTIVE | Noted: 2018-01-01

## 2018-02-24 NOTE — PROGRESS NOTES
Unremarkable. No gross of normality is seen. Mediastinum and александр: There are a few tiny subcentimeter lymph nodes in the mediastinum, likely postinflammatory. No abnormally enlarged hilar or mediastinal lymph nodes are seen. The pulmonary arteries are somewhat prominent, consistent with pulmonary arterial hypertension. There is a permanent pacemaker. Bones: There is an old mild compression fracture T11 and an old severe compression fracture T12. A small retropulsed bone fragment is seen along the superior posterior aspect of the T12 fracture with slight narrowing of the bony spinal canal.   Lungs: Patchy infiltrates are seen right upper lobe, left perihilar region, and both lung bases and there are small to moderate-sized bilateral pleural effusions. No evidence for empyema. 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 A few prominent bullous lesions are present in the lung bases. Impression:  1. No evidence for empyema. Small to moderate-sized bilateral pleural effusions. 2. Moderate patchy infiltrates right upper lobe, left perihilar region, and both lung bases. 3. Fibroemphysematous lungs. Pulmonary arterial hypertension.       (See actual reports for details)    Assessment   Acute respiratory failure with hypoxia and hypercapnia  HCAP---h/o MRSA infection  Small to moderate bilateral pleural effusion  Intervascular volume depletion  Metabolic encephalopathy  Alzheimer's dementia  Implanted pacemaker 2009  Recommendations   -Continuous as tolerated BiPAP 24/8 with BUR of 20  -Levofloxacin 500 mg Daily  -Zosyn 3.375 Q8 hrs  -Vancomycin with pharmacy to dose  -Follow pending Cultures  -IV fluid with D5 0.45%   -Palliative care and hospice consulted to discuss goals of care and code status.  -Not a candidate for invasive intervention for pleural effusion at this time   [facility]    Case discussed with nurse and patient family including her son, daughter and daughter in law  Questions and concerns

## 2018-02-24 NOTE — PROGRESS NOTES
Spoke with on-call hospice nurse. Hospice nurse stated that FLORIDALMAscott Angbruce is actually here at the hospital and will send the message to her for the consult.

## 2018-02-24 NOTE — DISCHARGE SUMMARY
Discharge Summary    Patient:  Keshav Nicholson  YOB: 1933    MRN: [id number]   Acct: [de-identified]    Primary Care Physician: April Garcia DO    Admit date:  2/22/2018    Discharge date:  2/24/2018       Discharge Diagnoses:   <principal problem not specified>  Active Problems:    Acute respiratory failure with hypoxia and hypercapnia (HCC)    Empyema lung ([address]    HCAP (healthcare-associated pneumonia)  Resolved Problems:    * No resolved hospital problems. *        Admitted for: (HPI)  The patient is a 80 y.o. female brought in from [facility] where she was recovering from a hip fracture. Patient had developed a cough and was lethargic today. Family said they saw her yesterday and she was alert and working with PT, but did have a cough productive of yellow phlegm. No reports of a fever. Patient has a history of dementia and sick sinus syndrome. Patient is currently on BIPAP and non verbal.  She is accompanied by 3 of her children. Family was considering possible intubation if she did not improve but were hesitant and wanted to discuss options first.     Hospital Course:  80year old female with limited mobility more than one month after hip fracture was admitted with acute hypoxemic respiratory failure due to pneumonia. Initially imaging was concerning for empyema however CT showed bilateral upper and lower pneumonia. Patient was treated with broad spectrum antibiotics and bipap. She was seen by pulmonology. admitting code status was Limited X 4. Patient declined and was agitated and uncomfortable and started on Morphine. She required hourly dosing and was restless and agitated despite treatment. Family meeting held and are agreeable to comfort measures only and hospice admission.       Consultants:  Patient Care Team:  April Garcia DO as PCP - General (Family Medicine)  April Garcia DO as PCP - S Attributed Provider  Bennett Singer MD as Consulting Physician (Pulmonary Disease)    Discharge Medications:     Medication List      ASK your doctor about these medications    acetaminophen 325 MG tablet  Commonly known as:  TYLENOL     albuterol (2.5 MG/3ML) 0.083% nebulizer solution  Commonly known as:  PROVENTIL     amLODIPine 2.5 MG tablet  Commonly known as:  NORVASC  Take 1 tablet by mouth daily     aspirin 81 MG tablet     calcium carbonate 500 MG chewable tablet  Commonly known as:  TUMS     hydrochlorothiazide 12.5 MG tablet  Commonly known as:  HYDRODIURIL  Take 1 tablet by mouth daily     LORazepam 0.5 MG tablet  Commonly known as:  ATIVAN     Melatonin 10 MG Caps     polyethylene glycol powder  Commonly known as:  GLYCOLAX     rivaroxaban 10 MG Tabs tablet  Commonly known as:  XARELTO  Take 1 tablet by mouth every 24 hours     senna 8.6 MG tablet  Commonly known as:  SENOKOT     verapamil 180 MG extended release tablet  Commonly known as:  CALAN SR              Physical Exam:    Vitals:  Vitals:    02/24/18 0551 02/24/18 0908 02/24/18 1041 02/24/18 1519   BP: 134/80  127/73 (!) 140/70   Pulse: 126  109 129   Resp:  23 20 25   Temp:   98.9 °F (37.2 °C)    TempSrc:   Axillary    SpO2:   94%    Weight:       Height:         Weight: Weight: 145 lb 14.4 oz (66.2 kg)     24 hour intake/output:  Intake/Output Summary (Last 24 hours) at 02/24/18 1734  Last data filed at 02/24/18 1532   Gross per 24 hour   Intake          3925.07 ml   Output              575 ml   Net          3350.07 ml       General appearance - alert awake appears to be in no acute distress  Chest - Bilateral air entry, no wheeze  Heart - S1S2 RRR, no murmurs or gallops  Abdomen - soft, non tender, normoactive bowel sounds  Neurological - non focal  Extremities - no edema  Skin - no rashes or lesions    Procedures:  na    Diagnostic Test:  CT chest    Radiology reports as per the Radiologist  Radiology:  Xr Abdomen (kub) (single Ap View)    Result Date: 2/24/2018  PROCEDURE: XR ABDOMEN (KUB) (SINGLE AP VIEW) CLINICAL INFORMATION: abdominal pain,  . COMPARISON: No prior study. TECHNIQUE: Portable supine FINDINGS: Bowel gas pattern is nonspecific with gas and stool to the rectum and scattered loops of nondilated gas-filled small bowel. No pathologic calcifications are seen. No abnormal masses are identified. EKG leads overlie the chest and abdomen and there is pleural thickening or small left pleural effusion. Nonspecific nonobstructive gas pattern **This report has been created using voice recognition software. It may contain minor errors which are inherent in voice recognition technology. ** Final report electronically signed by Dr. Shaye Dominguez on 2/24/2018 8:54 AM    Ct Chest Wo Contrast    Result Date: 2/23/2018  PROCEDURE: CT CHEST WO CONTRAST CLINICAL INFORMATION: ACUTE RESP ILLNESS, <36YEARS OLD, NEGATIVE EXAM, NO OTHER SYMPTOMS OR RISK FACTORS, pneumonia, empyema/abscess on chest xray COMPARISON: No prior study. TECHNIQUE: Multiple axial 5 millimeter images of the thorax and upper abdomen were obtained without the administration of intravenous contrast material . All CT scans at this facility use dose modulation, iterative reconstruction, and/or weight-based dosing when appropriate to reduce radiation dose to as low as reasonably achievable. FINDINGS: Upper abdomen: Unremarkable. No gross of normality is seen. Mediastinum and александр: There are a few tiny subcentimeter lymph nodes in the mediastinum, likely postinflammatory. No abnormally enlarged hilar or mediastinal lymph nodes are seen. The pulmonary arteries are somewhat prominent, consistent with pulmonary arterial hypertension. There is a permanent pacemaker. Bones: There is an old mild compression fracture T11 and an old severe compression fracture T12.  A small retropulsed bone fragment is seen along the superior posterior aspect of the T12 fracture with slight narrowing of the bony spinal canal. Lungs: Patchy infiltrates are seen right upper lobe, left perihilar region, and both lung bases and there are small to moderate-sized bilateral pleural effusions. No evidence for empyema. 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 A few prominent bullous lesions are present in the lung bases. 1. No evidence for empyema. Small to moderate-sized bilateral pleural effusions. 2. Moderate patchy infiltrates right upper lobe, left perihilar region, and both lung bases. 3. Fibroemphysematous lungs. Pulmonary arterial hypertension. **This report has been created using voice recognition software. It may contain minor errors which are inherent in voice recognition technology. ** Final report electronically signed by Dr. Luiz Velázquez on 2/23/2018 1:53 PM    Xr Chest Portable    Result Date: 2/22/2018  PROCEDURE: XR CHEST PORTABLE CLINICAL INFORMATION: tachypnea,  . COMPARISON: 1/9/2018 TECHNIQUE: Portable semiupright FINDINGS: Patient is rotated. Heart size is within normal limits. There is left basilar consolidation with central lucency suggesting possible abscess or empyema. Pulmonary vessels are not congested. Deformity of the right humeral head and neck from remote fracture. AICD and EKG leads overlie the chest.     Heterogeneous left lower lobe consolidation suggesting possible empyema or abscess. **This report has been created using voice recognition software. It may contain minor errors which are inherent in voice recognition technology. ** Final report electronically signed by Dr. Jo Ann Schwartz on 2/22/2018 2:44 PM      Results for orders placed or performed during the hospital encounter of 02/22/18   Culture blood #1   Result Value Ref Range    Blood Culture, Routine No growth-preliminary    Culture blood #2   Result Value Ref Range    Blood Culture, Routine No growth-preliminary    Urine culture   Result Value Ref Range    Urine Culture, Routine No growth-preliminary  No growth      Rapid influenza A/B antigens   Result Value Ref Range    Flu A Antigen NEGATIVE Excess (Calculated) 5.5 (H) -2.5 - 2.5 mmol/l    O2 Sat 96 %    IFIO2 40     DEVICE BiPAP     Mode NIV     SET RESPIRATORY RATE 20 bpm    SET PEEP 8.0 mmhg    SET PRESS SUPP 16 cmH2O    Dwight Test Positive     Source: L Radial     COLLECTED BY: [id number]    MRSA by PCR   Result Value Ref Range    MRSA SCREEN RT-PCR NEGATIVE    Blood Gas, Arterial   Result Value Ref Range    pH, Blood Gas 7.31 (L) 7.35 - 7.45    PCO2 64 (H) 35 - 45 mmhg    PO2 96 71 - 104 mmhg    HCO3 32 (H) 23 - 28 mmol/l    Base Excess (Calculated) 4.5 (H) -2.5 - 2.5 mmol/l    O2 Sat 97 %    IFIO2 40     DEVICE BiPAP     Mode CPAP/PS     SET RESPIRATORY RATE 20 bpm    SET PEEP 8.0 mmhg    SET PRESS SUPP 16 cmH2O    Dwight Test Positive     Source: L Radial     COLLECTED BY: [id number]    Anion Gap   Result Value Ref Range    Anion Gap 7.0 (L) 8.0 - 16.0 meq/L   Glomerular Filtration Rate, Estimated   Result Value Ref Range    Est, Glom Filt Rate >90 ml/min/1.73m2   Blood Gas, Arterial   Result Value Ref Range    pH, Blood Gas 7.36 7.35 - 7.45    PCO2 58 (H) 35 - 45 mmhg    PO2 106 (H) 71 - 104 mmhg    HCO3 33 (H) 23 - 28 mmol/l    Base Excess (Calculated) 5.9 (H) -2.5 - 2.5 mmol/l    O2 Sat 98 %    IFIO2 40     DEVICE BiPAP     Mode CPAP/PS     SET RESPIRATORY RATE 20 bpm    SET PEEP 6.0 mmhg    SET PRESS SUPP 14 cmH2O    Dwight Test Positive     Source: R Radial     COLLECTED BY: [id number]    Comprehensive metabolic panel   Result Value Ref Range    Glucose 131 (H) 70 - 108 mg/dL    CREATININE 0.5 0.4 - 1.2 mg/dL    BUN 19 7 - 22 mg/dL    Sodium 139 135 - 145 meq/L    Potassium 3.8 3.5 - 5.2 meq/L    Chloride 102 98 - 111 meq/L    CO2 29 23 - 33 meq/L    Calcium 9.2 8.5 - 10.5 mg/dL    AST 24 5 - 40 U/L    Alkaline Phosphatase 112 38 - 126 U/L    Total Protein 6.2 6.1 - 8.0 g/dL    Alb 2.6 (L) 3.5 - 5.1 g/dL    Total Bilirubin 0.3 0.3 - 1.2 mg/dL    ALT 30 11 - 66 U/L   CBC auto differential   Result Value Ref Range    WBC 8.1 4.8 - 10.8 thou/mm3    RBC 3.56

## 2018-02-24 NOTE — PROGRESS NOTES
mg Intravenous Q24H     Continuous Infusions:   dextrose 5 % and 0.45 % NaCl 100 mL/hr at 02/24/18 0426     PRN Meds:acetaminophen, morphine, sodium chloride flush, acetaminophen, magnesium hydroxide, ondansetron    Objective:  CBC:   Recent Labs      02/22/18   1421  02/23/18   0425   WBC  10.8  5.9   HGB  10.4*  9.0*   PLT  327  234     BMP:    Recent Labs      02/22/18   1421  02/23/18   0425   NA  140  140   K  4.3  4.5   CL  94*  103   CO2  32  30   BUN  25*  21   CREATININE  0.5  0.5   GLUCOSE  153*  102     Calcium:  Recent Labs      02/23/18   0425   CALCIUM  8.4*     Ionized Calcium:No results for input(s): IONCA in the last 72 hours. Magnesium:No results for input(s): MG in the last 72 hours. Phosphorus:No results for input(s): PHOS in the last 72 hours. BNP:No results for input(s): BNP in the last 72 hours. Glucose:No results for input(s): POCGLU in the last 72 hours. HgbA1C: No results for input(s): LABA1C in the last 72 hours. INR:   Recent Labs      02/22/18   1421   INR  1.03     Hepatic:   Recent Labs      02/22/18   1421   ALKPHOS  164*   ALT  41   AST  38   PROT  7.7   BILITOT  0.2*   LABALBU  3.7     Amylase and Lipase:No results for input(s): LACTA, AMYLASE in the last 72 hours. Lactic Acid: No results for input(s): LACTA in the last 72 hours. Troponin: No results for input(s): CKTOTAL, CKMB, TROPONINT in the last 72 hours. BNP: No results for input(s): BNP in the last 72 hours. Lipids: No results for input(s): CHOL, TRIG, HDL, LDLCALC in the last 72 hours. Invalid input(s): LDL  ABGs:   Lab Results   Component Value Date    PH 7.36 02/24/2018    PCO2 58 02/24/2018    PO2 106 02/24/2018    HCO3 33 02/24/2018    O2SAT 98 02/24/2018           Radiology reports as per the Radiologist  Radiology: Xr Chest Portable    Result Date: 2/22/2018  PROCEDURE: XR CHEST PORTABLE CLINICAL INFORMATION: tachypnea,  . COMPARISON: 1/9/2018 TECHNIQUE: Portable semiupright FINDINGS: Patient is rotated.

## 2018-02-24 NOTE — PROGRESS NOTES
Hospice referral completed with patient's family at bedside. Hospice concepts, philosophies, and services explained. Family wishing for comfort measures only and are agreeable to hospice services. Explained [facility] admission requirements and that patient meeting GIP status at this time. Discussed that after morphine started and patient comfortable BiPAP will be weaned and nasal cannula oxygen initiated and if patient remains stable we will transfer to  for hospice care. Family verbalizes understanding.

## 2018-02-24 NOTE — FLOWSHEET NOTE
02/24/18 0930   Encounter Summary   Services provided to: Patient and family together   Referral/Consult From: Nurse   Support System Children   Complexity of Encounter Moderate   Length of Encounter 15 minutes   Spiritual Assessment Completed Yes   Crisis   Type Emotional distress   Assessment Unable to respond   Intervention End of life care   Outcome Grieving   2/24/18  Responded to this patient at the request of the nurse. Had prayer with the family. They asked me to contact the  on call to administer last rites. I placed a call to the  on call and left a message.

## 2018-02-24 NOTE — PROGRESS NOTES
Patient lying in bed HOB elevated. Family at bedside. Patient turned and repositioned. Oral care provided. K-pad applied to lower back. No significant change since initial assessment.

## 2018-02-25 NOTE — PROGRESS NOTES
Magnet placed over cardiac pacer by Sandra Love RN.
Patient  at [id number] with family surrounding him. Family states that he was comfortable at time of death. Offered condolences. Hospice remains available for bereavement.
This RN was notified by the patient's daughter that she believes the patient has passed. This RN entered the room to the patient absent of respirations and a pulse. Patient's grandaughter states that patient took her last breath at 0844. Isis Barnett verified absence of vitals. Hospice nurse, Karen Moody notified. Karen Moody stated that she is currently at [facility] and would contact Dulce Maria Fox. House Supervisor, Geraldo, contacted and en route to floor.
hours.  BNP:No results for input(s): BNP in the last 72 hours. Glucose:No results for input(s): POCGLU in the last 72 hours. HgbA1C: No results for input(s): LABA1C in the last 72 hours. INR:   Recent Labs      02/22/18   1421   INR  1.03     Hepatic:   Recent Labs      02/24/18   0832   ALKPHOS  112   ALT  30   AST  24   PROT  6.2   BILITOT  0.3   LABALBU  2.6*     Amylase and Lipase:  Recent Labs      02/24/18   0832   LACTA  0.8   AMYLASE  32     Lactic Acid:   Recent Labs      02/24/18   0832   LACTA  0.8     Troponin: No results for input(s): CKTOTAL, CKMB, TROPONINT in the last 72 hours. BNP: No results for input(s): BNP in the last 72 hours. Lipids: No results for input(s): CHOL, TRIG, HDL, LDLCALC in the last 72 hours. Invalid input(s): LDL  ABGs:   Lab Results   Component Value Date    PH 7.36 02/24/2018    PCO2 58 02/24/2018    PO2 106 02/24/2018    HCO3 33 02/24/2018    O2SAT 98 02/24/2018           Radiology reports as per the Radiologist  Radiology: Xr Chest Portable    Result Date: 2/22/2018  PROCEDURE: XR CHEST PORTABLE CLINICAL INFORMATION: tachypnea,  . COMPARISON: 1/9/2018 TECHNIQUE: Portable semiupright FINDINGS: Patient is rotated. Heart size is within normal limits. There is left basilar consolidation with central lucency suggesting possible abscess or empyema. Pulmonary vessels are not congested. Deformity of the right humeral head and neck from remote fracture. AICD and EKG leads overlie the chest.     Heterogeneous left lower lobe consolidation suggesting possible empyema or abscess. **This report has been created using voice recognition software. It may contain minor errors which are inherent in voice recognition technology. ** Final report electronically signed by Dr. Kam Handy on 2/22/2018 2:44 PM        Physical Exam:  Vitals: BP (!) 154/92   Pulse 152   Temp 99.1 °F (37.3 °C) (Axillary)   Resp 22   Ht 5' 4\" (1.626 m)   Wt 145 lb (65.8 kg)   SpO2 93%   BMI 24.89 kg/m²

## 2018-02-28 LAB
BLOOD CULTURE, ROUTINE: NORMAL
BLOOD CULTURE, ROUTINE: NORMAL
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
## 2024-04-29 NOTE — PLAN OF CARE
Problem: Falls - Risk of  Goal: Absence of falls  Outcome: Ongoing  No falls this shift. Patient's family is able to use call light appropriately. Hourly rounding. Problem: Risk for Impaired Skin Integrity  Goal: Tissue integrity - skin and mucous membranes  Structural intactness and normal physiological function of skin and  mucous membranes. Outcome: Ongoing  No new skin integrity issues this shift. Patient is dependent in the bed. She requires turning and repositioning in bed. Problem:  Activity:  Goal: Fatigue will decrease  Fatigue will decrease   Outcome: Not Met This Shift
Patient will remain in the unit due to complex medical decision making in the setting of serious illness.      Patient to remain in PCU for symptom directed care and monitoring of clinical trajectory
condition or treatment will be avoided or minimized  Complications related to the disease process, condition or treatment will be avoided or minimized   Outcome: Ongoing  Complications are avoided thus far this shift. Patient is on continuous BiPAP. Problem: Discharge Planning:  Goal: Discharged to appropriate level of care  Discharged to appropriate level of care   Outcome: Ongoing  Patient is a limited No x 4. Patient will definitely need assistance at discharge. Will continue to educate family and will continue to discuss patient needs with patient and family. Problem: Pain:  Goal: Pain level will decrease  Pain level will decrease   Outcome: Ongoing  Patient is having pain and agitation at this time. Patient is not able to communicate a number. Patient's pain via the FACES scale was 8/10 at it's highest. Patient is able to get morphine every hour as needed for pain. Pain goal is 5/10. Goal: Control of acute pain  Control of acute pain   Outcome: Ongoing  Acute generalized pain. Goal: Control of chronic pain  Control of chronic pain   Outcome: Ongoing  Chronic back pain. Lidocaine is in place on back. Problem: DISCHARGE BARRIERS  Goal: Patient's continuum of care needs are met  Outcome: Ongoing  Patient needs are being met. Patient's family is being educated on patient needs. Comments: Care plan reviewed with patient and family members. Patient and family members verbalize understanding of the plan of care and contribute to goal setting.

## 2025-05-01 NOTE — CONSULTS
Formr SMR patient asking to now see OM for July or August    Daily  verapamil (CALAN SR) extended release tablet 180 mg, 180 mg, Oral, Nightly    Social History:  Social History     Social History    Marital status:      Spouse name: N/A    Number of children: N/A    Years of education: N/A     Occupational History    Not on file. Social History Main Topics    Smoking status: Former Smoker     Years: 5.00     Types: Cigarettes     Quit date: 6/19/1987    Smokeless tobacco: Never Used      Comment: Former light tobacco smoker.  Alcohol use No    Drug use: No    Sexual activity: Not on file     Other Topics Concern    Not on file     Social History Narrative    No narrative on file     Lives With: Son (Son and daughter in-law )  Type of Home: House  Home Layout: One level  Home Access: Stairs to enter with rails  Entrance Stairs - Number of Steps: 3 AWILDA  Home Equipment: 4 wheeled walker      Bathroom Shower/Tub: Tub/Shower unit  Bathroom Toilet: Handicap height  Bathroom Accessibility: Accessible     ADL Assistance: Needs assistance (Family asists with showering tasks, and dressing. )  Homemaking Assistance: Needs assistance (Family assists with cooking, cleaning, and laundry. )  Homemaking Responsibilities: No     Ambulation Assistance: Independent (No AD. )  Transfer Assistance: Independent     Active : No  Occupation: Retired    Family History:   History reviewed. No pertinent family history.     Review of Systems:  CONSTITUTIONAL:  negative  EYES:  negative  HEENT:  positive for hard of hearing  RESPIRATORY:  On O2 per [place]  CARDIOVASCULAR:  negative  GASTROINTESTINAL:  positive for constipation  GENITOURINARY:  positive for dhillon  SKIN:  Left hip incision  HEMATOLOGIC/LYMPHATIC:  negative  MUSCULOSKELETAL:  positive for  decreased range of motion  NEUROLOGICAL:  positive for memory problems and gait problems  BEHAVIOR/PSYCH:  negative  10 point system review otherwise negative    Physical Exam:  BP (!) 102/59   Pulse 83   Temp 98.1 °F (36.7 °C) (Oral)   Resp 16   Ht 5' 4\" (1.626 m)   Wt 132 lb 1.6 oz (59.9 kg)   SpO2 99%   BMI 22.67 kg/m²   awake  Orientation:   Person only  Mood: within normal limits  Affect: calm  General appearance: Patient is well nourished, well developed, well groomed and in no acute distress, appearing younger than stated age    Memory:   abnormal - history of dementia,   Attention/Concentration: abnormal - poor eye contact, refers to son for most questioning  Language:  normal    Cranial Nerves:  cranial nerves II-XII are grossly intact  ROM:  abnormal - limited in left leg  Motor Exam: poor cooperation with motor exam due to understanding of commands. Was able to grasp hands bilaterally, but did not attempt to squeeze for strength.    Tone:  normal  Muscle bulk: within normal limits    Skin: warm and dry, no rash or erythema and left hip incision intact, no redness swelling or drainage  Peripheral vascular: Pulses: Normal upper and lower extremity pulses; Edema: trace  Left hip     Diagnostics:  Recent Results (from the past 24 hour(s))   Hemoglobin and Hematocrit, Blood    Collection Time: 01/08/18  6:55 AM   Result Value Ref Range    Hemoglobin 9.3 (L) 12.0 - 16.0 gm/dl    Hematocrit 27.9 (L) 37.0 - 47.0 %   Basic Metabolic Panel    Collection Time: 01/08/18  6:55 AM   Result Value Ref Range    Sodium 138 135 - 145 meq/L    Potassium 4.9 3.5 - 5.2 meq/L    Chloride 103 98 - 111 meq/L    CO2 29 23 - 33 meq/L    Glucose 134 (H) 70 - 108 mg/dL    BUN 17 7 - 22 mg/dL    CREATININE 0.6 0.4 - 1.2 mg/dL    Calcium 8.5 8.5 - 10.5 mg/dL   Anion Gap    Collection Time: 01/08/18  6:55 AM   Result Value Ref Range    Anion Gap 6.0 (L) 8.0 - 16.0 meq/L   Glomerular Filtration Rate, Estimated    Collection Time: 01/08/18  6:55 AM   Result Value Ref Range    Est, Glom Filt Rate >90 ml/min/1.73m2       Impression:  · Fall  · Left displaced subcapital femur fracture  · S/p MIKO 1/7/18  · Left hip pain  · Gait dysfunction  · Recent right

## (undated) DEVICE — COVER ARMBRD W13XL28.5IN IMPERV BLU FOR OP RM

## (undated) DEVICE — SOLUTION IV IRRIG WATER 1000ML POUR BRL 2F7114

## (undated) DEVICE — GOWN,SIRUS,NON REINFRCD,LARGE,SET IN SL: Brand: MEDLINE

## (undated) DEVICE — SUTURE VCRL SZ 1 L36IN ABSRB UD CTX L48MM 1/2 CIR J977H

## (undated) DEVICE — GLOVE ORANGE PI 7   MSG9070

## (undated) DEVICE — SUREFIT, DUAL DISPERSIVE ELECTRODE, CONTACT QUALITY MONITOR: Brand: SUREFIT

## (undated) DEVICE — GLOVE SURG SZ 65 THK91MIL LTX FREE SYN POLYISOPRENE

## (undated) DEVICE — SOLUTION IV IRRIG POUR BRL 0.9% SODIUM CHL 2F7124

## (undated) DEVICE — YANKAUER,BULB TIP,W/O VENT,RIGID,STERILE: Brand: MEDLINE

## (undated) DEVICE — POSITIONER HD W8XH4XL8.5IN RASPBERRY FOAM SLT

## (undated) DEVICE — SUTURE MCRYL SZ 3-0 L27IN ABSRB UD L24MM PS-1 3/8 CIR PRIM Y936H

## (undated) DEVICE — SOLUTION IV 1000ML 0.9% SOD CHL PH 5 INJ USP VIAFLX PLAS

## (undated) DEVICE — 3M™ WARMING BLANKET, UPPER BODY, 10 PER CASE, 42268: Brand: BAIR HUGGER™